# Patient Record
Sex: FEMALE | Race: BLACK OR AFRICAN AMERICAN | NOT HISPANIC OR LATINO | Employment: OTHER | URBAN - METROPOLITAN AREA
[De-identification: names, ages, dates, MRNs, and addresses within clinical notes are randomized per-mention and may not be internally consistent; named-entity substitution may affect disease eponyms.]

---

## 2018-04-08 PROBLEM — F03.918 DEMENTIA WITH BEHAVIORAL DISTURBANCE: Chronic | Status: RESOLVED | Noted: 2018-04-08 | Resolved: 2018-04-08

## 2018-04-08 PROBLEM — I10 ESSENTIAL HYPERTENSION: Chronic | Status: ACTIVE | Noted: 2018-04-08

## 2018-04-08 PROBLEM — E78.5 HYPERLIPIDEMIA: Chronic | Status: ACTIVE | Noted: 2018-04-08

## 2018-04-08 PROBLEM — K21.9 GERD (GASTROESOPHAGEAL REFLUX DISEASE): Chronic | Status: ACTIVE | Noted: 2018-04-08

## 2018-04-08 PROBLEM — R07.9 CHEST PAIN: Status: ACTIVE | Noted: 2018-04-08

## 2018-04-08 PROBLEM — F03.918 DEMENTIA WITH BEHAVIORAL DISTURBANCE: Chronic | Status: ACTIVE | Noted: 2018-04-08

## 2018-04-09 PROBLEM — R55 SYNCOPE AND COLLAPSE: Status: ACTIVE | Noted: 2018-04-09

## 2018-04-09 PROBLEM — B02.9 SHINGLES: Status: ACTIVE | Noted: 2018-04-09

## 2018-04-09 PROBLEM — Z86.73 HISTORY OF CVA (CEREBROVASCULAR ACCIDENT): Chronic | Status: ACTIVE | Noted: 2018-04-09

## 2018-04-10 ENCOUNTER — HOSPITAL ENCOUNTER (INPATIENT)
Facility: HOSPITAL | Age: 83
LOS: 2 days | Discharge: HOME HEALTH | DRG: 312 | End: 2018-04-13
Attending: EMERGENCY MEDICINE | Admitting: HOSPITALIST
Payer: MEDICARE

## 2018-04-10 DIAGNOSIS — R55 SYNCOPE AND COLLAPSE: ICD-10-CM

## 2018-04-10 DIAGNOSIS — R19.5 HEME POSITIVE STOOL: ICD-10-CM

## 2018-04-10 DIAGNOSIS — R55 SYNCOPE, UNSPECIFIED SYNCOPE TYPE: Primary | ICD-10-CM

## 2018-04-10 DIAGNOSIS — R19.7 DIARRHEA, UNSPECIFIED TYPE: ICD-10-CM

## 2018-04-10 DIAGNOSIS — R07.9 CHEST PAIN: ICD-10-CM

## 2018-04-10 DIAGNOSIS — R55 SYNCOPE: ICD-10-CM

## 2018-04-10 DIAGNOSIS — K92.2 GASTROINTESTINAL HEMORRHAGE, UNSPECIFIED GASTROINTESTINAL HEMORRHAGE TYPE: ICD-10-CM

## 2018-04-10 DIAGNOSIS — Z86.73 HISTORY OF CVA (CEREBROVASCULAR ACCIDENT): Chronic | ICD-10-CM

## 2018-04-10 DIAGNOSIS — I10 ESSENTIAL HYPERTENSION: Chronic | ICD-10-CM

## 2018-04-10 DIAGNOSIS — R53.83 FATIGUE: ICD-10-CM

## 2018-04-10 DIAGNOSIS — N17.9 ACUTE RENAL FAILURE, UNSPECIFIED ACUTE RENAL FAILURE TYPE: ICD-10-CM

## 2018-04-10 PROBLEM — E03.8 SUBCLINICAL HYPOTHYROIDISM: Status: ACTIVE | Noted: 2018-04-10

## 2018-04-10 LAB
ALBUMIN SERPL BCP-MCNC: 4 G/DL
ALP SERPL-CCNC: 131 U/L
ALT SERPL W/O P-5'-P-CCNC: 49 U/L
ANION GAP SERPL CALC-SCNC: 9 MMOL/L
AST SERPL-CCNC: 38 U/L
BASOPHILS # BLD AUTO: 0.01 K/UL
BASOPHILS NFR BLD: 0.1 %
BILIRUB SERPL-MCNC: 0.5 MG/DL
BNP SERPL-MCNC: 15 PG/ML
BUN SERPL-MCNC: 46 MG/DL
CALCIUM SERPL-MCNC: 9.6 MG/DL
CHLORIDE SERPL-SCNC: 105 MMOL/L
CO2 SERPL-SCNC: 26 MMOL/L
CREAT SERPL-MCNC: 2.1 MG/DL
DIFFERENTIAL METHOD: ABNORMAL
EOSINOPHIL # BLD AUTO: 0 K/UL
EOSINOPHIL NFR BLD: 0.4 %
ERYTHROCYTE [DISTWIDTH] IN BLOOD BY AUTOMATED COUNT: 15.6 %
EST. GFR  (AFRICAN AMERICAN): 24 ML/MIN/1.73 M^2
EST. GFR  (NON AFRICAN AMERICAN): 21 ML/MIN/1.73 M^2
GLUCOSE SERPL-MCNC: 159 MG/DL
HCT VFR BLD AUTO: 41.5 %
HGB BLD-MCNC: 14.1 G/DL
LACTATE SERPL-SCNC: 2.5 MMOL/L
LYMPHOCYTES # BLD AUTO: 1 K/UL
LYMPHOCYTES NFR BLD: 13.3 %
MCH RBC QN AUTO: 32 PG
MCHC RBC AUTO-ENTMCNC: 34 G/DL
MCV RBC AUTO: 94 FL
MONOCYTES # BLD AUTO: 0.6 K/UL
MONOCYTES NFR BLD: 7.4 %
NEUTROPHILS # BLD AUTO: 6 K/UL
NEUTROPHILS NFR BLD: 78.8 %
PLATELET # BLD AUTO: 149 K/UL
PMV BLD AUTO: 9 FL
POTASSIUM SERPL-SCNC: 5 MMOL/L
PROT SERPL-MCNC: 8 G/DL
RBC # BLD AUTO: 4.41 M/UL
SODIUM SERPL-SCNC: 140 MMOL/L
TROPONIN I SERPL DL<=0.01 NG/ML-MCNC: <0.006 NG/ML
TSH SERPL DL<=0.005 MIU/L-ACNC: 10.02 UIU/ML
WBC # BLD AUTO: 7.69 K/UL

## 2018-04-10 PROCEDURE — 87040 BLOOD CULTURE FOR BACTERIA: CPT | Mod: 59

## 2018-04-10 PROCEDURE — 83605 ASSAY OF LACTIC ACID: CPT

## 2018-04-10 PROCEDURE — 83880 ASSAY OF NATRIURETIC PEPTIDE: CPT

## 2018-04-10 PROCEDURE — 84443 ASSAY THYROID STIM HORMONE: CPT

## 2018-04-10 PROCEDURE — 84439 ASSAY OF FREE THYROXINE: CPT

## 2018-04-10 PROCEDURE — 93005 ELECTROCARDIOGRAM TRACING: CPT

## 2018-04-10 PROCEDURE — 80053 COMPREHEN METABOLIC PANEL: CPT

## 2018-04-10 PROCEDURE — 82550 ASSAY OF CK (CPK): CPT

## 2018-04-10 PROCEDURE — 84484 ASSAY OF TROPONIN QUANT: CPT

## 2018-04-10 PROCEDURE — 86901 BLOOD TYPING SEROLOGIC RH(D): CPT

## 2018-04-10 PROCEDURE — 93010 ELECTROCARDIOGRAM REPORT: CPT | Mod: ,,, | Performed by: INTERNAL MEDICINE

## 2018-04-10 PROCEDURE — 85025 COMPLETE CBC W/AUTO DIFF WBC: CPT

## 2018-04-11 PROBLEM — K92.2 GIB (GASTROINTESTINAL BLEEDING): Status: ACTIVE | Noted: 2018-04-11

## 2018-04-11 PROBLEM — R55 SYNCOPE: Status: ACTIVE | Noted: 2018-04-11

## 2018-04-11 PROBLEM — N17.9 ACUTE RENAL FAILURE: Status: ACTIVE | Noted: 2018-04-11

## 2018-04-11 LAB
ABO + RH BLD: NORMAL
ANION GAP SERPL CALC-SCNC: 8 MMOL/L
BASOPHILS # BLD AUTO: 0 K/UL
BASOPHILS NFR BLD: 0 %
BILIRUB UR QL STRIP: NEGATIVE
BLD GP AB SCN CELLS X3 SERPL QL: NORMAL
BUN SERPL-MCNC: 36 MG/DL
CALCIUM SERPL-MCNC: 8.7 MG/DL
CHLORIDE SERPL-SCNC: 111 MMOL/L
CK SERPL-CCNC: 303 U/L
CLARITY UR: CLEAR
CO2 SERPL-SCNC: 18 MMOL/L
COLOR UR: YELLOW
CREAT SERPL-MCNC: 1.4 MG/DL
DIFFERENTIAL METHOD: ABNORMAL
EOSINOPHIL # BLD AUTO: 0 K/UL
EOSINOPHIL NFR BLD: 0.5 %
ERYTHROCYTE [DISTWIDTH] IN BLOOD BY AUTOMATED COUNT: 15.4 %
EST. GFR  (AFRICAN AMERICAN): 39 ML/MIN/1.73 M^2
EST. GFR  (NON AFRICAN AMERICAN): 34 ML/MIN/1.73 M^2
ESTIMATED PA SYSTOLIC PRESSURE: 34.81
GLOBAL PERICARDIAL EFFUSION: NORMAL
GLUCOSE SERPL-MCNC: 109 MG/DL
GLUCOSE UR QL STRIP: NEGATIVE
HCT VFR BLD AUTO: 40.1 %
HGB BLD-MCNC: 13.2 G/DL
HGB UR QL STRIP: NEGATIVE
KETONES UR QL STRIP: NEGATIVE
LEUKOCYTE ESTERASE UR QL STRIP: NEGATIVE
LYMPHOCYTES # BLD AUTO: 1.3 K/UL
LYMPHOCYTES NFR BLD: 20.2 %
MCH RBC QN AUTO: 31.6 PG
MCHC RBC AUTO-ENTMCNC: 32.9 G/DL
MCV RBC AUTO: 96 FL
MITRAL VALVE REGURGITATION: NORMAL
MONOCYTES # BLD AUTO: 0.6 K/UL
MONOCYTES NFR BLD: 9 %
NEUTROPHILS # BLD AUTO: 4.6 K/UL
NEUTROPHILS NFR BLD: 70.3 %
NITRITE UR QL STRIP: NEGATIVE
PH UR STRIP: 5 [PH] (ref 5–8)
PLATELET # BLD AUTO: 114 K/UL
PMV BLD AUTO: 9.2 FL
POTASSIUM SERPL-SCNC: 5.8 MMOL/L
PROT UR QL STRIP: NEGATIVE
RBC # BLD AUTO: 4.18 M/UL
RETIRED EF AND QEF - SEE NOTES: 55 (ref 55–65)
SODIUM SERPL-SCNC: 137 MMOL/L
SP GR UR STRIP: 1.01 (ref 1–1.03)
T4 FREE SERPL-MCNC: 0.98 NG/DL
TRICUSPID VALVE REGURGITATION: NORMAL
TROPONIN I SERPL DL<=0.01 NG/ML-MCNC: <0.006 NG/ML
URN SPEC COLLECT METH UR: NORMAL
UROBILINOGEN UR STRIP-ACNC: NEGATIVE EU/DL
WBC # BLD AUTO: 6.53 K/UL

## 2018-04-11 PROCEDURE — 84484 ASSAY OF TROPONIN QUANT: CPT

## 2018-04-11 PROCEDURE — 99222 1ST HOSP IP/OBS MODERATE 55: CPT | Mod: ,,, | Performed by: PSYCHIATRY & NEUROLOGY

## 2018-04-11 PROCEDURE — 93306 TTE W/DOPPLER COMPLETE: CPT | Mod: 26,,, | Performed by: INTERNAL MEDICINE

## 2018-04-11 PROCEDURE — 36415 COLL VENOUS BLD VENIPUNCTURE: CPT

## 2018-04-11 PROCEDURE — 80048 BASIC METABOLIC PNL TOTAL CA: CPT

## 2018-04-11 PROCEDURE — 93306 TTE W/DOPPLER COMPLETE: CPT

## 2018-04-11 PROCEDURE — 11000001 HC ACUTE MED/SURG PRIVATE ROOM

## 2018-04-11 PROCEDURE — 25000003 PHARM REV CODE 250: Performed by: HOSPITALIST

## 2018-04-11 PROCEDURE — 87086 URINE CULTURE/COLONY COUNT: CPT

## 2018-04-11 PROCEDURE — 25000003 PHARM REV CODE 250: Performed by: EMERGENCY MEDICINE

## 2018-04-11 PROCEDURE — C9113 INJ PANTOPRAZOLE SODIUM, VIA: HCPCS | Performed by: HOSPITALIST

## 2018-04-11 PROCEDURE — 94761 N-INVAS EAR/PLS OXIMETRY MLT: CPT

## 2018-04-11 PROCEDURE — 85025 COMPLETE CBC W/AUTO DIFF WBC: CPT

## 2018-04-11 PROCEDURE — 81003 URINALYSIS AUTO W/O SCOPE: CPT

## 2018-04-11 PROCEDURE — 97162 PT EVAL MOD COMPLEX 30 MIN: CPT

## 2018-04-11 PROCEDURE — 97530 THERAPEUTIC ACTIVITIES: CPT

## 2018-04-11 PROCEDURE — G8978 MOBILITY CURRENT STATUS: HCPCS | Mod: CJ

## 2018-04-11 PROCEDURE — 63600175 PHARM REV CODE 636 W HCPCS: Performed by: HOSPITALIST

## 2018-04-11 PROCEDURE — G8979 MOBILITY GOAL STATUS: HCPCS | Mod: CH

## 2018-04-11 RX ORDER — PANTOPRAZOLE SODIUM 40 MG/1
40 TABLET, DELAYED RELEASE ORAL DAILY
Status: DISCONTINUED | OUTPATIENT
Start: 2018-04-11 | End: 2018-04-11

## 2018-04-11 RX ORDER — PROCHLORPERAZINE EDISYLATE 5 MG/ML
5 INJECTION INTRAMUSCULAR; INTRAVENOUS EVERY 6 HOURS PRN
Status: DISCONTINUED | OUTPATIENT
Start: 2018-04-11 | End: 2018-04-13 | Stop reason: HOSPADM

## 2018-04-11 RX ORDER — MEMANTINE HYDROCHLORIDE 10 MG/1
10 TABLET ORAL 2 TIMES DAILY
Status: DISCONTINUED | OUTPATIENT
Start: 2018-04-11 | End: 2018-04-13 | Stop reason: HOSPADM

## 2018-04-11 RX ORDER — POTASSIUM CHLORIDE 20 MEQ/1
20 TABLET, EXTENDED RELEASE ORAL DAILY
Status: DISCONTINUED | OUTPATIENT
Start: 2018-04-11 | End: 2018-04-11

## 2018-04-11 RX ORDER — ACETAMINOPHEN 325 MG/1
650 TABLET ORAL EVERY 8 HOURS PRN
Status: DISCONTINUED | OUTPATIENT
Start: 2018-04-11 | End: 2018-04-13 | Stop reason: HOSPADM

## 2018-04-11 RX ORDER — VALACYCLOVIR HYDROCHLORIDE 500 MG/1
1000 TABLET, FILM COATED ORAL 3 TIMES DAILY
Status: DISCONTINUED | OUTPATIENT
Start: 2018-04-11 | End: 2018-04-13 | Stop reason: HOSPADM

## 2018-04-11 RX ORDER — ATORVASTATIN CALCIUM 10 MG/1
10 TABLET, FILM COATED ORAL DAILY
Status: DISCONTINUED | OUTPATIENT
Start: 2018-04-11 | End: 2018-04-13 | Stop reason: HOSPADM

## 2018-04-11 RX ORDER — ONDANSETRON 2 MG/ML
4 INJECTION INTRAMUSCULAR; INTRAVENOUS EVERY 4 HOURS PRN
Status: DISCONTINUED | OUTPATIENT
Start: 2018-04-11 | End: 2018-04-13 | Stop reason: HOSPADM

## 2018-04-11 RX ORDER — MORPHINE SULFATE 4 MG/ML
2 INJECTION, SOLUTION INTRAMUSCULAR; INTRAVENOUS EVERY 4 HOURS PRN
Status: DISCONTINUED | OUTPATIENT
Start: 2018-04-11 | End: 2018-04-13 | Stop reason: HOSPADM

## 2018-04-11 RX ORDER — GABAPENTIN 100 MG/1
100 CAPSULE ORAL 3 TIMES DAILY
Status: DISCONTINUED | OUTPATIENT
Start: 2018-04-11 | End: 2018-04-13 | Stop reason: HOSPADM

## 2018-04-11 RX ORDER — SODIUM CHLORIDE 9 MG/ML
INJECTION, SOLUTION INTRAVENOUS CONTINUOUS
Status: DISCONTINUED | OUTPATIENT
Start: 2018-04-11 | End: 2018-04-13 | Stop reason: HOSPADM

## 2018-04-11 RX ORDER — RANOLAZINE 500 MG/1
1000 TABLET, EXTENDED RELEASE ORAL 2 TIMES DAILY
Status: DISCONTINUED | OUTPATIENT
Start: 2018-04-11 | End: 2018-04-13 | Stop reason: HOSPADM

## 2018-04-11 RX ORDER — BENAZEPRIL HYDROCHLORIDE 10 MG/1
10 TABLET ORAL DAILY
Status: DISCONTINUED | OUTPATIENT
Start: 2018-04-11 | End: 2018-04-11

## 2018-04-11 RX ORDER — ISOSORBIDE MONONITRATE 30 MG/1
30 TABLET, EXTENDED RELEASE ORAL DAILY
Status: DISCONTINUED | OUTPATIENT
Start: 2018-04-11 | End: 2018-04-11

## 2018-04-11 RX ORDER — PANTOPRAZOLE SODIUM 40 MG/10ML
40 INJECTION, POWDER, LYOPHILIZED, FOR SOLUTION INTRAVENOUS 2 TIMES DAILY
Status: DISCONTINUED | OUTPATIENT
Start: 2018-04-11 | End: 2018-04-11 | Stop reason: CLARIF

## 2018-04-11 RX ADMIN — MEMANTINE 10 MG: 10 TABLET ORAL at 08:04

## 2018-04-11 RX ADMIN — GABAPENTIN 100 MG: 100 CAPSULE ORAL at 08:04

## 2018-04-11 RX ADMIN — GABAPENTIN 100 MG: 100 CAPSULE ORAL at 09:04

## 2018-04-11 RX ADMIN — ATORVASTATIN CALCIUM 10 MG: 10 TABLET, FILM COATED ORAL at 08:04

## 2018-04-11 RX ADMIN — VALACYCLOVIR HYDROCHLORIDE 1000 MG: 500 TABLET, FILM COATED ORAL at 02:04

## 2018-04-11 RX ADMIN — DEXTROSE 40 MG: 50 INJECTION, SOLUTION INTRAVENOUS at 08:04

## 2018-04-11 RX ADMIN — RANOLAZINE 1000 MG: 500 TABLET, FILM COATED, EXTENDED RELEASE ORAL at 08:04

## 2018-04-11 RX ADMIN — VALACYCLOVIR HYDROCHLORIDE 1000 MG: 500 TABLET, FILM COATED ORAL at 09:04

## 2018-04-11 RX ADMIN — VALACYCLOVIR HYDROCHLORIDE 1000 MG: 500 TABLET, FILM COATED ORAL at 08:04

## 2018-04-11 RX ADMIN — DEXTROSE 40 MG: 50 INJECTION, SOLUTION INTRAVENOUS at 09:04

## 2018-04-11 RX ADMIN — GABAPENTIN 100 MG: 100 CAPSULE ORAL at 02:04

## 2018-04-11 RX ADMIN — RANOLAZINE 1000 MG: 500 TABLET, FILM COATED, EXTENDED RELEASE ORAL at 09:04

## 2018-04-11 RX ADMIN — MEMANTINE 10 MG: 10 TABLET ORAL at 09:04

## 2018-04-11 RX ADMIN — SODIUM CHLORIDE: 0.9 INJECTION, SOLUTION INTRAVENOUS at 04:04

## 2018-04-11 NOTE — PROGRESS NOTES
charge Nurse Jackie' ambulated pt to bathroom, yellow socks in use. linens were on floor prior to fall but were picked up prior to walking to bathroom. pt assisted to toilet. informed pt not to get up. door closed for privacy. Nurse remained in room. Nurse Leif called from desk to inform me that pts tele lead was off. opened bathroom door and pt was on knees. pt states she did not hit head, states she hit right ear-no redness/swelling noted.Pt assisted back to bed. Dr Jurado informed. VSS. Pt states she stood up to wipe herself and got weak.  Informed Dr Jurado that it appeared that some blood was in pt stool. House Horacio Dozier notified. bed alarm in use.

## 2018-04-11 NOTE — PLAN OF CARE
04/11/18 1040   Discharge Assessment   Assessment Type Discharge Planning Assessment   Confirmed/corrected address and phone number on facesheet? Yes   Assessment information obtained from? Other   Prior to hospitilization cognitive status: Alert/Oriented   Prior to hospitalization functional status: Independent   Current cognitive status: Alert/Oriented   Current Functional Status: Independent   Lives With alone   Able to Return to Prior Arrangements yes   Is patient able to care for self after discharge? Yes   Who are your caregiver(s) and their phone number(s)? Ling Hayes @ [phone]   Patient's perception of discharge disposition home health   Readmission Within The Last 30 Days previous discharge plan unsuccessful   Patient currently being followed by outpatient case management? No   Patient currently receives any other outside agency services? No   Equipment Currently Used at Home cane, straight;walker, rolling   Do you have any problems affording any of your prescribed medications? No   Is the patient taking medications as prescribed? yes   Does the patient have transportation home? Yes   Transportation Available car;family or friend will provide   Does the patient receive services at the [facility]? No   Discharge Plan A Home;Home Health   Discharge Plan B Home;Home Health   Patient/Family In Agreement With Plan yes   Readmission Questionnaire   At the time of your discharge, did someone talk to you about what your health problems were? Yes   At the time of discharge, did someone talk to you about what to watch out for regarding worsening of your health problem? Yes   At the time of discharge, did someone talk to you about what to do if you experienced worsening of your health problem? Yes   At the time of discharge, did someone talk to you about which medication to take when you left the hospital and which ones to stop taking? Yes   At the time of discharge, did someone talk to you about when and  where to follow up with a doctor after you left the hospital? Yes   How often do you need to have someone help you when you read instructions, pamphlets, or other written material from your doctor or pharmacy? Sometimes   Do you have problems taking your medications as prescribed? No   Do you have any problems affording any of  your prescribed medications? No   Do you have problems obtaining/receiving your medications? No   Does the patient have transportation to healthcare appointments? Yes   Does the patient have family/friends to help with healtcare needs after discharge? yes   Does your caregiver provide all the help you need? Yes   Are you currently feeling confused? No   Are you currently having problems thinking? No   Are you currently having memory problems? No   Have you felt down, depressed, or hopeless? 1   Have you felt little interest or pleasure in doing things? 1   In the last 7 days, my sleep quality was: fair         [id number] [place] [facility] [address] [facility]  [address] [address]  Phone: [phone] Fax: [phone]

## 2018-04-11 NOTE — ED NOTES
Pt kept insisting that she wanted to get up and go to the bathroom; pt rolled to restroom on EMS stretcher; upon sitting pt up on EMS stretcher, pt had near syncopal episode; pt safely assisted back onto EMS stretcher; will room pt in ED soon;

## 2018-04-11 NOTE — ED NOTES
Lab arrived at bedside for blood draw but pt currently on bedpan; lab will return when pt is finished

## 2018-04-11 NOTE — PLAN OF CARE
Problem: Patient Care Overview  Goal: Plan of Care Review  Patient with no concerns voiced, able to make her needs known, IV fluids infusing as ordered, tolerating clear liquid diet w/o difficulties, ambulated to bathroom w/assist, safety maintained.

## 2018-04-11 NOTE — ED PROVIDER NOTES
Encounter Date: 4/10/2018       History     Chief Complaint   Patient presents with    Generalized Weakness     pt c/o generalized weakness; pt hypotensive 70/42 on scene with EMS; pt recieved 500ml NS bolus and now 110/50; pt was just discharged from here a few hours prior with shingles     This is an emergent evaluation of an 86-year-old female who presents status post syncopal episode.  Patient was OBS'ed here Sunday 4/8/18 - today Tuesday 4/10/18 for chest pain; during this visit, she was also diagnosed with shingles (L chest wall).  She was discharged home today with her niece.  Patient was in her niece's vehicle when she blacked out; niece activated EMS.  Patient passed out again here in the ED, after she insisted on using our [place] restroom.  She denies chest pain or shortness of breath.  She denies mechanical falls.      Lives alone in second-floor apartment.    Niece: Brenda Keen, [phone] (mobile) / [phone] (house).    PMH: HTN, DLD  PSH: hysterectomy, back surgeyr  Social: nonsmoker          Review of patient's allergies indicates:   Allergen Reactions    Codeine     Darvon [propoxyphene]     Excedrin migraine [aspirin-acetaminophen-caffeine]     Zoloft [sertraline]      Past Medical History:   Diagnosis Date    Allergic rhinitis, cause unspecified     Essential hypertension, benign     Hyperlipidemia     Hypertension     Unspecified vitamin D deficiency      Past Surgical History:   Procedure Laterality Date    BACK SURGERY      HYSTERECTOMY       No family history on file.  Social History   Substance Use Topics    Smoking status: Never Smoker    Smokeless tobacco: Not on file    Alcohol use No     Review of Systems   Constitutional: Negative for appetite change, diaphoresis and fever.   HENT: Negative for rhinorrhea.    Eyes: Negative for visual disturbance.   Respiratory: Negative for shortness of breath.    Cardiovascular: Negative for chest pain.   Gastrointestinal: Negative  for abdominal pain and vomiting.   Genitourinary: Negative for flank pain.   Musculoskeletal: Negative for back pain and neck stiffness.   Skin: Negative for pallor.   Neurological: Positive for syncope.       Physical Exam     Initial Vitals [04/10/18 2002]   BP Pulse Resp Temp SpO2   (!) 110/50 67 16 97.4 °F (36.3 °C) 97 %      MAP       70         Physical Exam    Nursing note and vitals reviewed.  Constitutional: She appears well-developed and well-nourished. She is not diaphoretic. No distress.   Awake and alert, elderly woman. Nontoxic. Fluent speech. NAD.   HENT:   Head: Normocephalic and atraumatic.   Mouth/Throat: Oropharynx is clear and moist.   Eyes: Conjunctivae and EOM are normal. Pupils are equal, round, and reactive to light.   Neck: Normal range of motion. Neck supple.   Cardiovascular: Normal rate, regular rhythm and intact distal pulses.   No murmur heard.  Pulmonary/Chest: Breath sounds normal. No respiratory distress. She has no wheezes. She has no rhonchi. She has no rales.   L chest wall with vesicular dermatomal rash c/w shingles. No significant discharge. Minimal erythema.   Abdominal: Soft. Bowel sounds are normal. She exhibits no distension. There is no tenderness. There is no rebound and no guarding.   Genitourinary: Rectal exam shows guaiac positive stool. Guaiac positive stool. : Acceptable.  Musculoskeletal: Normal range of motion. She exhibits no edema or tenderness.   Neurological: She is alert and oriented to person, place, and time. She has normal strength. No cranial nerve deficit.   Skin: Skin is warm and dry.   Psychiatric: She has a normal mood and affect.         ED Course   Procedures  Labs Reviewed   CULTURE, BLOOD   CULTURE, BLOOD   CULTURE, URINE   CBC W/ AUTO DIFFERENTIAL   COMPREHENSIVE METABOLIC PANEL   LACTIC ACID, PLASMA   B-TYPE NATRIURETIC PEPTIDE   URINALYSIS   TROPONIN I   TSH     EKG Readings: (Independently Interpreted)   EMS 19:24: NSR, HR 65. L  axis. Normal intervals. No STEMI.   ED 20:45: NSR, HR 61. L axis. Normal intervals. No STEMI. C/w 4/9/18.         X-Rays:   Independently Interpreted Readings:   Other Readings:  CXR NAD.     Medical Decision Making:   History:   Old Medical Records: I decided to obtain old medical records.  Old Records Summarized: records from previous admission(s).  Initial Assessment:   86-year-old female status post multiple syncopal episodes today.  Patient was just discharged earlier this afternoon after a chest pain OBS stay.  Differential Diagnosis:   Ddx includes ACS, CHF, PE, orthostatic syncope, dehydration, KEN, hypoglycemia, occult infection, other.  Independently Interpreted Test(s):   I have ordered and independently interpreted X-rays - see prior notes.  I have ordered and independently interpreted EKG Reading(s) - see prior notes  Clinical Tests:   Lab Tests: Ordered and Reviewed  Radiological Study: Ordered and Reviewed  Medical Tests: Ordered and Reviewed  ED Management:  EKG and CXR c/w prior.    Patient's BPs low in ED despite NS bolus by EMS (99/58) but then spontaneously improved. Otherwise reassuring vitals and exam. Has L chest wall shingles but no evidence of superinfection.    While in ED, patient had several BMs, initially dark, then more red in color. Strongly heme+.     Labs: Hgb stable. BUN 46, creatinine 2.1; BUN 21, creatinine 0.9 on 4/8/18. Lactate 2.5.     Given acute renal insufficiency and GI bleed with multiple episodes of syncope today, patient requires re-admission for monitoring and care. I have discussed her case with nocturnist Dr. Gaxiola. I have written courtesy orders including echocardiogram (patient has never had in our system), telemetry monitoring (for syncope), and repeat CBC, troponin, BMP.    Patient is aware of plan.  Other:   I have discussed this case with another health care provider.                      Clinical Impression:   The primary encounter diagnosis was Syncope,  unspecified syncope type. Diagnoses of Fatigue, Chest pain, Heme positive stool, Gastrointestinal hemorrhage, unspecified gastrointestinal hemorrhage type, Diarrhea, unspecified type, Acute renal failure, unspecified acute renal failure type, and Syncope were also pertinent to this visit.                           Jessica Lane MD  04/11/18 06

## 2018-04-11 NOTE — CONSULTS
[facility]  Neurology  Consult Note    Patient Name: Christina Curtis  MRN: [id number]  Admission Date: 4/10/2018  Hospital Length of Stay: 0 days  Code Status: Full Code   Attending Provider: Juarez Victoria MD   Consulting Provider: Edwin Harris MD  Primary Care Physician: Neetu Mcelroy MD  Principal Problem:<principal problem not specified>    Inpatient consult to Neurology  Consult performed by: EDWIN HARRIS  Consult ordered by: JUAREZ VICTORIA        Subjective:     Chief Complaint: Syncope    HPI: 85 y/o female with medical Hx as listed below comes to ED for syncope. Pt recently admitted for chest pain found to have active   herpes-zoster in left trunk area. Today pt had a syncope while being the passenger in a vehicle. Regained consciousness with no resulting motor or sensory deficits.    She also had a syncope while in hospital upon sitting up with reported convulsions during her last admission.     Past Medical History:   Diagnosis Date    Allergic rhinitis, cause unspecified     Essential hypertension, benign     Hyperlipidemia     Hypertension     Shingles     Unspecified vitamin D deficiency        Past Surgical History:   Procedure Laterality Date    BACK SURGERY      HYSTERECTOMY         Review of patient's allergies indicates:   Allergen Reactions    Codeine     Darvon [propoxyphene]     Excedrin migraine [aspirin-acetaminophen-caffeine]     Zoloft [sertraline]        Current Neurological Medications:     Current Facility-Administered Medications on File Prior to Encounter   Medication    [DISCONTINUED] acetaminophen tablet 500 mg    [DISCONTINUED] amLODIPine tablet 10 mg    [DISCONTINUED] aspirin tablet 325 mg    [DISCONTINUED] atorvastatin tablet 10 mg    [DISCONTINUED] benazepril tablet 10 mg    [DISCONTINUED] benazepril tablet 20 mg    [DISCONTINUED] cloNIDine tablet 0.1 mg    [DISCONTINUED] enoxaparin injection 40 mg    [DISCONTINUED] gabapentin capsule  100 mg    [DISCONTINUED] isosorbide mononitrate 24 hr tablet 30 mg    [DISCONTINUED] memantine tablet 10 mg    [DISCONTINUED] ondansetron disintegrating tablet 8 mg    [DISCONTINUED] oxyCODONE-acetaminophen 5-325 mg per tablet 1 tablet    [DISCONTINUED] pantoprazole EC tablet 40 mg    [DISCONTINUED] promethazine (PHENERGAN) 12.5 mg in dextrose 5 % 50 mL IVPB    [DISCONTINUED] ramelteon tablet 8 mg    [DISCONTINUED] ranolazine 12 hr tablet 1,000 mg    [DISCONTINUED] senna-docusate 8.6-50 mg per tablet 1 tablet    [DISCONTINUED] valACYclovir tablet 1,000 mg     Current Outpatient Prescriptions on File Prior to Encounter   Medication Sig    benzonatate (TESSALON) 100 MG capsule     NAMENDA 10 mg Tab Take 10 mg by mouth 2 (two) times daily.    RANEXA 1,000 mg Tb12     acetaminophen (TYLENOL) 500 MG tablet Take 500 mg by mouth every 6 (six) hours as needed for Pain.    atorvastatin (LIPITOR) 10 MG tablet Take 10 mg by mouth once daily.    benazepril (LOTENSIN) 10 MG tablet Take 1 tablet (10 mg total) by mouth once daily.    cefUROXime (CEFTIN) 500 MG tablet Take 500 mg by mouth 2 (two) times daily.    ergocalciferol (ERGOCALCIFEROL) 50,000 unit Cap Take 50,000 Units by mouth every 7 days.    gabapentin (NEURONTIN) 100 MG capsule Take 100 mg by mouth 3 (three) times daily.    guaifenesin (MUCINEX) 600 mg 12 hr tablet Take 1,200 mg by mouth 2 (two) times daily.    guaifenesin 100 mg/5 ml (ROBITUSSIN) 100 mg/5 mL syrup Take 200 mg by mouth 3 (three) times daily as needed for Cough.    isosorbide mononitrate (IMDUR) 30 MG 24 hr tablet Take 30 mg by mouth once daily.    pantoprazole (PROTONIX) 40 MG tablet Take 40 mg by mouth once daily.    potassium chloride (MICRO-K) 10 MEQ CpSR Take 10 mEq by mouth once daily.    traMADol (ULTRAM) 50 mg tablet Take 1 tablet (50 mg total) by mouth every 6 (six) hours as needed for Pain (9-10/10 pain).    valACYclovir (VALTREX) 1000 MG tablet Take 1 tablet (1,000 mg  total) by mouth 3 (three) times daily.      Family History     None        Social History Main Topics    Smoking status: Never Smoker    Smokeless tobacco: Never Used    Alcohol use No    Drug use: No    Sexual activity: No     Review of Systems   Constitutional: Negative for fever.   HENT: Negative for trouble swallowing.    Eyes: Negative for photophobia.   Respiratory: Negative for shortness of breath.    Cardiovascular: Negative for palpitations.   Gastrointestinal: Negative for abdominal pain.   Genitourinary: Negative for dysuria.   Musculoskeletal: Negative for myalgias.   Neurological: Negative for headaches.   Psychiatric/Behavioral: Negative for hallucinations.     Objective:     Vital Signs (Most Recent):  Temp: 98.5 °F (36.9 °C) (04/11/18 1326)  Pulse: 72 (04/11/18 1326)  Resp: 18 (04/11/18 1326)  BP: 139/64 (04/11/18 1326)  SpO2: 96 % (04/11/18 1326) Vital Signs (24h Range):  Temp:  [97 °F (36.1 °C)-98.6 °F (37 °C)] 98.5 °F (36.9 °C)  Pulse:  [60-76] 72  Resp:  [16-20] 18  SpO2:  [96 %-100 %] 96 %  BP: ()/(47-66) 139/64     Weight: 55.8 kg (123 lb)  Body mass index is 22.5 kg/m².    Physical Exam  Constitutional: She is oriented to person, place, and time. No distress.   HENT:   Head: Normocephalic.   Eyes: Right eye exhibits no discharge. Left eye exhibits no discharge.   Neck: Neck supple.   Cardiovascular: Normal rate.    Pulmonary/Chest: Breath sounds normal.   Abdominal: Bowel sounds are normal.   Musculoskeletal: She exhibits no edema.   Neurological: She is oriented to person, place, and time. She has normal strength. She has a normal Finger-Nose-Finger Test.   Skin: She is not diaphoretic.   Psychiatric: Her speech is normal.         NEUROLOGICAL EXAMINATION:      MENTAL STATUS   Oriented to person, place, and time.   Speech: speech is normal   Level of consciousness: alert     CRANIAL NERVES      CN III, IV, VI   Right pupil: Size: 2 mm. Shape: regular. Reactivity: brisk.   Left  pupil: Size: 2 mm. Shape: regular. Reactivity: brisk.   Nystagmus: none   Ophthalmoparesis: none     CN VII   Right facial weakness: none  Left facial weakness: none     CN IX, X   Palate: symmetric     CN XII   Tongue deviation: none     MOTOR EXAM      Strength   Strength 5/5 throughout.      SENSORY EXAM   Right arm light touch: normal  Left arm light touch: normal  Right leg light touch: normal  Left leg light touch: normal     GAIT AND COORDINATION      Coordination   Finger to nose coordination: normal     Tremor   Resting tremor: absent     MMSE: 25/30           Significant Labs:   CBC:   Recent Labs  Lab 04/10/18  2217 04/11/18  0658   WBC 7.69 6.53   HGB 14.1 13.2   HCT 41.5 40.1   * 114*     CMP:   Recent Labs  Lab 04/10/18  2217 04/11/18  0658   * 109    137   K 5.0 5.8*    111*   CO2 26 18*   BUN 46* 36*   CREATININE 2.1* 1.4   CALCIUM 9.6 8.7   PROT 8.0  --    ALBUMIN 4.0  --    BILITOT 0.5  --    ALKPHOS 131  --    AST 38  --    ALT 49*  --    ANIONGAP 9 8   EGFRNONAA 21* 34*           Assessment and Plan:     85 y/o female consulted for syncope    1. Syncope: pt has been hypotensive. GI consulted due to blood in stool.    H/H stable.    No focal findings on exam.   -Will monitor for now.   -No driving.   -Perhaps is safe for pt to be in an assisted-living facility vs moving in with family?    Active Diagnoses:    Diagnosis Date Noted POA    Syncope [R55] 04/11/2018 Yes      Problems Resolved During this Admission:    Diagnosis Date Noted Date Resolved POA       VTE Risk Mitigation         Ordered     IP VTE HIGH RISK PATIENT  Once      04/11/18 0318     Reason for No Pharmacological VTE Prophylaxis  Once      04/11/18 0318     Place KATALINA hose  Until discontinued      04/11/18 0318     Place sequential compression device  Until discontinued      04/11/18 0318          Thank you for your consult. I will follow-up with patient. Please contact us if you have any additional  questions.    Edwin Michelle MD  Neurology  [facility]

## 2018-04-11 NOTE — ED NOTES
Lab returned to bedside but pt is being cleaned from having a 3rd BM; lab will return when pt is ready

## 2018-04-11 NOTE — ED NOTES
Pt had very large loose BM on bedpan; pt cleaned; pt tolerated well; pt states that she feels much better now since having the BM; pt reports that she drank prune juice today to have the BM

## 2018-04-11 NOTE — ED NOTES
Pt had another liquid BM and urinated in bedpan; pt cleaned; pt informed that we need a urine sample; pt reports that she will try to give just a urine sample; pt placed back on bedpan;

## 2018-04-11 NOTE — ED TRIAGE NOTES
Patient brought in via EMS c/o general weakness.she was previously discharged from the facility.Was hypotensive on arrival with EMS received fluids and was brought over. Patient mentions she was previously diagnosed with shingles on left lateral back. Denies SOB,vomiting, nausea and fever.

## 2018-04-11 NOTE — ED NOTES
Pt had 4th BM; stool is now reddish brown liquid; MD notified and at bedside to do check stool for blood; guiac positive

## 2018-04-12 LAB
ALBUMIN SERPL BCP-MCNC: 3.1 G/DL
ALP SERPL-CCNC: 88 U/L
ALT SERPL W/O P-5'-P-CCNC: 31 U/L
ANION GAP SERPL CALC-SCNC: 5 MMOL/L
AST SERPL-CCNC: 21 U/L
BASOPHILS # BLD AUTO: 0.01 K/UL
BASOPHILS NFR BLD: 0.2 %
BILIRUB SERPL-MCNC: 0.7 MG/DL
BUN SERPL-MCNC: 13 MG/DL
CALCIUM SERPL-MCNC: 8.9 MG/DL
CHLORIDE SERPL-SCNC: 110 MMOL/L
CO2 SERPL-SCNC: 24 MMOL/L
CREAT SERPL-MCNC: 0.9 MG/DL
DIFFERENTIAL METHOD: ABNORMAL
EOSINOPHIL # BLD AUTO: 0 K/UL
EOSINOPHIL NFR BLD: 0.5 %
ERYTHROCYTE [DISTWIDTH] IN BLOOD BY AUTOMATED COUNT: 15.6 %
EST. GFR  (AFRICAN AMERICAN): >60 ML/MIN/1.73 M^2
EST. GFR  (NON AFRICAN AMERICAN): 58 ML/MIN/1.73 M^2
GLUCOSE SERPL-MCNC: 111 MG/DL
HCT VFR BLD AUTO: 38.3 %
HGB BLD-MCNC: 12.5 G/DL
LYMPHOCYTES # BLD AUTO: 1.5 K/UL
LYMPHOCYTES NFR BLD: 27.3 %
MAGNESIUM SERPL-MCNC: 1.8 MG/DL
MCH RBC QN AUTO: 31.6 PG
MCHC RBC AUTO-ENTMCNC: 32.6 G/DL
MCV RBC AUTO: 97 FL
MONOCYTES # BLD AUTO: 0.3 K/UL
MONOCYTES NFR BLD: 5.1 %
NEUTROPHILS # BLD AUTO: 3.8 K/UL
NEUTROPHILS NFR BLD: 66.9 %
PHOSPHATE SERPL-MCNC: 2.5 MG/DL
PLATELET # BLD AUTO: 104 K/UL
PMV BLD AUTO: 9 FL
POTASSIUM SERPL-SCNC: 4.7 MMOL/L
PROT SERPL-MCNC: 6.5 G/DL
RBC # BLD AUTO: 3.96 M/UL
SODIUM SERPL-SCNC: 139 MMOL/L
WBC # BLD AUTO: 5.65 K/UL

## 2018-04-12 PROCEDURE — 11000001 HC ACUTE MED/SURG PRIVATE ROOM

## 2018-04-12 PROCEDURE — 85025 COMPLETE CBC W/AUTO DIFF WBC: CPT

## 2018-04-12 PROCEDURE — 25000003 PHARM REV CODE 250: Performed by: EMERGENCY MEDICINE

## 2018-04-12 PROCEDURE — 97165 OT EVAL LOW COMPLEX 30 MIN: CPT

## 2018-04-12 PROCEDURE — G8988 SELF CARE GOAL STATUS: HCPCS | Mod: CI

## 2018-04-12 PROCEDURE — G8987 SELF CARE CURRENT STATUS: HCPCS | Mod: CJ

## 2018-04-12 PROCEDURE — 83735 ASSAY OF MAGNESIUM: CPT

## 2018-04-12 PROCEDURE — 97116 GAIT TRAINING THERAPY: CPT

## 2018-04-12 PROCEDURE — 80053 COMPREHEN METABOLIC PANEL: CPT

## 2018-04-12 PROCEDURE — G8989 SELF CARE D/C STATUS: HCPCS | Mod: CJ

## 2018-04-12 PROCEDURE — 25000003 PHARM REV CODE 250: Performed by: HOSPITALIST

## 2018-04-12 PROCEDURE — 93010 ELECTROCARDIOGRAM REPORT: CPT | Mod: ,,, | Performed by: INTERNAL MEDICINE

## 2018-04-12 PROCEDURE — 63600175 PHARM REV CODE 636 W HCPCS: Performed by: HOSPITALIST

## 2018-04-12 PROCEDURE — 84100 ASSAY OF PHOSPHORUS: CPT

## 2018-04-12 PROCEDURE — C9113 INJ PANTOPRAZOLE SODIUM, VIA: HCPCS | Performed by: HOSPITALIST

## 2018-04-12 PROCEDURE — 97110 THERAPEUTIC EXERCISES: CPT

## 2018-04-12 PROCEDURE — 36415 COLL VENOUS BLD VENIPUNCTURE: CPT

## 2018-04-12 PROCEDURE — 93005 ELECTROCARDIOGRAM TRACING: CPT

## 2018-04-12 PROCEDURE — 99232 SBSQ HOSP IP/OBS MODERATE 35: CPT | Mod: ,,, | Performed by: PSYCHIATRY & NEUROLOGY

## 2018-04-12 RX ADMIN — MEMANTINE 10 MG: 10 TABLET ORAL at 09:04

## 2018-04-12 RX ADMIN — VALACYCLOVIR HYDROCHLORIDE 1000 MG: 500 TABLET, FILM COATED ORAL at 09:04

## 2018-04-12 RX ADMIN — RANOLAZINE 1000 MG: 500 TABLET, FILM COATED, EXTENDED RELEASE ORAL at 09:04

## 2018-04-12 RX ADMIN — DEXTROSE 40 MG: 50 INJECTION, SOLUTION INTRAVENOUS at 09:04

## 2018-04-12 RX ADMIN — GABAPENTIN 100 MG: 100 CAPSULE ORAL at 09:04

## 2018-04-12 RX ADMIN — GABAPENTIN 100 MG: 100 CAPSULE ORAL at 03:04

## 2018-04-12 RX ADMIN — ATORVASTATIN CALCIUM 10 MG: 10 TABLET, FILM COATED ORAL at 09:04

## 2018-04-12 RX ADMIN — SODIUM CHLORIDE: 0.9 INJECTION, SOLUTION INTRAVENOUS at 03:04

## 2018-04-12 RX ADMIN — VALACYCLOVIR HYDROCHLORIDE 1000 MG: 500 TABLET, FILM COATED ORAL at 03:04

## 2018-04-12 NOTE — PROGRESS NOTES
Chief Complaint / Reason for Consult: Hematochezia    No further sx's of overt GI bleeding    ROS: No CP, SOB, F/C    Patient Vitals for the past 24 hrs:   BP Temp Temp src Pulse Resp SpO2   04/12/18 1200 (!) 143/64 97.2 °F (36.2 °C) Oral 65 18 100 %   04/12/18 0734 131/60 97.9 °F (36.6 °C) Oral 60 18 100 %   04/12/18 0545 (!) 146/66 98.6 °F (37 °C) Oral 60 18 97 %   04/12/18 0004 133/63 98.9 °F (37.2 °C) Oral 63 18 98 %   04/11/18 2044 (!) 124/58 98.5 °F (36.9 °C) Oral 69 18 97 %   04/11/18 1626 (!) 138/56 97.9 °F (36.6 °C) Oral 71 18 95 %       Physical Exam:  Gen - Well developed, elderly, no apparent distress  HEENT - Anicteric  CV - S1, S2, no murmurs/rubs  Lungs - CTA-B, normal excursion  Abd - Soft, NT, ND, normal BS's, no HSM.  Ext - No c/c/e  Neuro - No asterixis    Labs:    Recent Labs  Lab 04/12/18  0843   WBC 5.65   RBC 3.96*   HGB 12.5   HCT 38.3   *   MCV 97   MCH 31.6*   MCHC 32.6       Recent Labs  Lab 04/12/18  0843   CALCIUM 8.9   PROT 6.5      K 4.7   CO2 24      BUN 13   CREATININE 0.9   ALKPHOS 88   ALT 31   AST 21   BILITOT 0.7       Imaging:  Reviewed CT Head, no acute findings    Assessment:  This patient is a 86 y.o. female with:   1. Hematochezia - Most consistent with LGI (mild diverticular or hemorrhoidal).  Appears to have ceased.  No anemia  2. Syncope  3. HTN, Hyperlipidemia.....    Recommendations:  1. Monitor for sx's of bleeding.  2. OK for diet, as tolerated.  3. Outpatient evaluation for C-scope  4. Continue to treat other medical issues.  5. Will follow briefly.

## 2018-04-12 NOTE — PT/OT/SLP EVAL
Physical Therapy Evaluation     Patient Name:  Christina Curtis   MRN:  [id number]     Recommendations:      Discharge Recommendations: SNF  Discharge Equipment Recommendations: shower chair and bedside commode  Barriers to discharge: Inaccessible home and Decreased caregiver support     Assessment:      Christina Curtis is a 86 y.o. female admitted with a medical diagnosis of syncope.  She presents with the following impairments/functional limitations:  weakness, impaired self care skills, impaired functional mobilty, gait instability, impaired balance, decreased safety awareness.     Rehab Prognosis:  Fair+; patient would benefit from acute skilled PT services to address these deficits and reach maximum level of function.       Recent Surgery: * No surgery found *       Plan:      During this hospitalization, patient to be seen 6 x/week (M-F, S or S) to address the above listed problems via gait training, therapeutic activities, therapeutic exercises  § Plan of Care Expires:  04/25/18  · Plan of Care Reviewed with: patient     Subjective      Patient found in bed upon PT entry to room, agreeable to evaluation.       Chief Complaint: weakness  Patient comments/goals: to get well   Pain/Comfort:  · Pain Rating 1: 0/10     Living Environment:  Pt reported living alone in an apartment on the 2nd floor with no elevators.  Pt has limited assistance from a niece.  Pt has no children.   Prior to admission, patients level of function was independent.  Patient has the following equipment: cane, straight, walker, rolling.  Upon discharge, patient will have limited assistance from niece.     Objective:      Patient found with: peripheral IV, telemetry, Av[employer] monitor      General Precautions: Standard, fall, contact, airborne (2* shingles)   Orthopedic Precautions:N/A   Braces: N/A      Exams:  · Cognitive Exam:  Patient was able to follow 2 step commands.  Pt was A+Ox3 (name, place, and time).     · Gross Motor  Coordination:  WFL  · Postural Exam:  Patient presented with the following abnormalities:    · -       No postural abnormalities identified  · Sensation:    · -       Intact  light/touch BLE  · Skin Integrity/Edema:      · -       Skin integrity: Visible skin intact; shingles to L trunk  · -       Edema: None noted BLE  · RLE ROM: WFL  · RLE Strength: WFL  · LLE ROM: WFL  · LLE Strength: WFL     Functional Mobility:  · Bed Mobility:     · Scooting: CGA  · Supine to Sit: CGA x 2 trials  · Sit to Supine: CGA x 2 trials  · Transfers:     · Sit to Stand:  contact guard assistance/Victor M with no AD and rolling walker x 4 trials  · Bed<>bedside commode: contact guard assistance/Victor M with no AD using  Step Transfer  · Gait: Pt ambulated ~30 ft with RW/CGA-Victor M.  Pt with decreased step length and uriah.  Pt with minimal unsteadiness.  Pt easily fatigued.   · Balance: Pt with fair/fair+ balance.    Therapeutic Activity:  BLE seated therex 10 reps: hip flex, LAQ, HS, and AP  BLE standing therex 2 sets of 10 reps with seated rest break bt sets: marches and calf raises.  Pt easily fatigued during standing therex.       AM-PAC 6 CLICK MOBILITY  Total Score:20                Patient left in bed with all lines intact, call button in reach, bed alarm on and nurse Maral notified.  Avasys monitor present     GOALS:          Physical Therapy Goals            Problem: Physical Therapy Goal      Goal Priority Disciplines Outcome Goal Variances Interventions   Physical Therapy Goal      PT/OT, PT         Description:  Goals to be met by: 18      Patient will increase functional independence with mobility by performin. Supine to sit with Modified [place]  2. Rolling to Left and Right with Modified [place]  3. Sit to stand transfer with Modified [place] using RW  4. Bed to chair transfer with Modified [place] using Rolling Walker  5. Gait  x 150 feet with Modified [place] using Rolling Walker   6. Lower  extremity exercise program x 30 reps per handout, with independence                           History:           Past Medical History:   Diagnosis Date    Allergic rhinitis, cause unspecified      Essential hypertension, benign      Hyperlipidemia      Hypertension      Unspecified vitamin D deficiency                 Past Surgical History:   Procedure Laterality Date    BACK SURGERY        HYSTERECTOMY             Clinical Decision Making:      History  Co-morbidities and personal factors that may impact the plan of care Examination  Body Structures and Functions, activity limitations and participation restrictions that may impact the plan of care Clinical Presentation    Decision Making/ Complexity Score   Co-morbidities:   [] Time since onset of injury / illness / exacerbation  [x] Status of current condition  [x]Patient's cognitive status and safety concerns    [] Multiple Medical Problems (see med hx)  Personal Factors:   [x] Patient's age  [] Prior Level of function   [x] Patient's home situation (environment and family support)  [] Patient's level of motivation  [] Expected progression of patient        HISTORY:(criteria)     [] 75457 - no personal factors/history     [] 43318 - has 1-2 personal factor/comorbidity      [x] 91438 - has >3 personal factor/comorbidity       Body Regions:  [] Objective examination findings  [] Head     []  Neck  [] Trunk   [] Upper Extremity  [] Lower Extremity     Body Systems:  [x] For communication ability, affect, cognition, language, and learning style: the assessment of the ability to make needs known, consciousness, orientation (person, place, and time), expected emotional /behavioral responses, and learning preferences (eg, learning barriers, education  needs)  [x] For the neuromuscular system: a general assessment of gross coordinated movement (eg, balance, gait, locomotion, transfers, and transitions) and motor function  (motor control and motor learning)  [x]  For the musculoskeletal system: the assessment of gross symmetry, gross range of motion, gross strength, height, and weight  [] For the integumentary system: the assessment of pliability(texture), presence of scar formation, skin color, and skin integrity  [] For cardiovascular/pulmonary system: the assessment of heart rate, respiratory rate, blood pressure, and edema      Activity limitations:    [x] Patient's cognitive status and saf ety concerns          [x] Status of current condition          [] Weight bearing restriction  [] Cardiopulmunary Restriction     Participation Restrictions:   [] Goals and goal agreement with the patient     [] Rehab potential (prognosis) and probable outcome       Examination of Body System: (criteria)     [] 81611 - addressing 1-2 elements     [x] 46550 - addressing a total of 3 or more elements      [] 20438 -  Addressing a total of 4 or more elements             Clinical Presentation: (criteria)                   On examination of body system using standardized tests and measures patient presents with  elements from any of the following: body structures and functions, activity limitations, and/or participation restrictions.  Leading to a clinical presentation that is considered                                             Clinical Decision Making  (Eval Complexity):        Time Tracking:      PT Received On: 04/11/18  PT Start Time: 1631     PT Stop Time: 1701  PT Total Time (min): 30 min      Billable Minutes: Evaluation 20 min and 10 min NORAH Aquino, PT  04/11/2018

## 2018-04-12 NOTE — PT/OT/SLP PROGRESS
"Physical Therapy Treatment    Patient Name:  Christina Curtis   MRN:  [id number]    Recommendations:     Discharge Recommendations:  nursing facility, skilled   Discharge Equipment Recommendations: bedside commode, shower chair   Barriers to discharge: Decreased caregiver support (pt lives alone), inaccessible home (lives on 2nd floor of Apartment)    Assessment:     Christina Curtis is a 86 y.o. female admitted with a medical diagnosis of Syncope.  She presents with the following impairments/functional limitations:  weakness, impaired endurance, impaired self care skills, impaired functional mobilty, impaired balance, gait instability, decreased coordination, decreased lower extremity function, decreased safety awareness, decreased ROM.  Pt performed BM with SUP, transfers with supervision, RW, and gait with RW, SBA.  Pt ambulated ~100ft with RW, SBA in room 2/2 being airborne.    Rehab Prognosis:  good; patient would benefit from acute skilled PT services to address these deficits and reach maximum level of function.      Recent Surgery: Procedure(s) (LRB):  ESOPHAGOGASTRODUODENOSCOPY (EGD) (N/A)      Plan:     During this hospitalization, patient to be seen 6 x/week (M-F; Sat or Sun) to address the above listed problems via gait training, therapeutic activities, therapeutic exercises  · Plan of Care Expires:  04/25/18   Plan of Care Reviewed with: patient    Subjective     Communicated with pt's nurse, Jovanni, prior to session.  Patient found supine with ARJUN Lyn upon PT entry to room, agreeable to treatment.      Chief Complaint: wanting to go home  Patient comments/goals: Pt states " I need to go home to pay my bills."  Pain/Comfort:  · Pain Rating 1: 0/10    Patients cultural, spiritual, Druze conflicts given the current situation:      Objective:     Patient found with: telemetry, peripheral IV     General Precautions: Standard, contact, fall, airborne (Shingles)   Orthopedic Precautions:N/A "   Braces: N/A     Functional Mobility:  · Bed Mobility:     · Scooting: supervision  · Supine to Sit: supervision  · Sit to Supine: supervision  · Transfers:     · Sit to Stand:  contact guard assistance with rolling walker  · Gait: Pt ambulated ~100ft with RW, SBA with decreased uriah, step length, velocity of limb, weight shifting ability, and postural control.  · Balance: good sitting and fair standing      AM-PAC 6 CLICK MOBILITY  Turning over in bed (including adjusting bedclothes, sheets and blankets)?: 4  Sitting down on and standing up from a chair with arms (e.g., wheelchair, bedside commode, etc.): 3  Moving from lying on back to sitting on the side of the bed?: 4  Moving to and from a bed to a chair (including a wheelchair)?: 3  Need to walk in hospital room?: 3  Climbing 3-5 steps with a railing?: 3  Total Score: 20       Therapeutic Activities and Exercises:   Pt performed standing therex to BLEs x15 reps AROM including: marching, hamstring curls, hip abduction, heel raises, toe raises(with LOB x1 with min A for recovery)    Patient left supine with all lines intact, call button in reach, bed alarm on, pt's nurse, Jovanni, notified and AVASYS present..    GOALS:    Physical Therapy Goals        Problem: Physical Therapy Goal    Goal Priority Disciplines Outcome Goal Variances Interventions   Physical Therapy Goal     PT/OT, PT      Description:  Goals to be met by: 18     Patient will increase functional independence with mobility by performin. Supine to sit with Modified [place]  2. Rolling to Left and Right with Modified [place]  3. Sit to stand transfer with Modified [place] using RW  4. Bed to chair transfer with Modified [place] using Rolling Walker  5. Gait  x 150 feet with Modified [place] using Rolling Walker   6. Lower extremity exercise program x 30 reps per handout, with independence                      Time Tracking:     PT Received On: 18  PT  Start Time: 1334     PT Stop Time: 1400  PT Total Time (min): 26 min     Billable Minutes: Gait Training 13 and Therapeutic Exercise 13    Treatment Type: Treatment  PT/PTA: PTA     PTA Visit Number: 1     Usha Ware PTA  04/12/2018
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
## 2018-04-12 NOTE — PLAN OF CARE
Problem: Physical Therapy Goal  Goal: Physical Therapy Goal  Goals to be met by: 18     Patient will increase functional independence with mobility by performin. Supine to sit with Modified [place]  2. Rolling to Left and Right with Modified [place]  3. Sit to stand transfer with Modified [place] using RW  4. Bed to chair transfer with Modified [place] using Rolling Walker  5. Gait  x 150 feet with Modified [place] using Rolling Walker   6. Lower extremity exercise program x 30 reps per handout, with independence     Outcome: Ongoing (interventions implemented as appropriate)  Pt ambulated ~100ft with RW, SBA with no LOB noted.  Pt requires assistance for all OOB mobility 2/2 safety.

## 2018-04-12 NOTE — SUBJECTIVE & OBJECTIVE
Past Medical History:   Diagnosis Date    Allergic rhinitis, cause unspecified     Essential hypertension, benign     Hyperlipidemia     Hypertension     Shingles     Unspecified vitamin D deficiency        Past Surgical History:   Procedure Laterality Date    BACK SURGERY      HYSTERECTOMY         Review of patient's allergies indicates:   Allergen Reactions    Codeine     Darvon [propoxyphene]     Excedrin migraine [aspirin-acetaminophen-caffeine]     Zoloft [sertraline]        No current facility-administered medications on file prior to encounter.      Current Outpatient Prescriptions on File Prior to Encounter   Medication Sig    benzonatate (TESSALON) 100 MG capsule     NAMENDA 10 mg Tab Take 10 mg by mouth 2 (two) times daily.    RANEXA 1,000 mg Tb12     acetaminophen (TYLENOL) 500 MG tablet Take 500 mg by mouth every 6 (six) hours as needed for Pain.    atorvastatin (LIPITOR) 10 MG tablet Take 10 mg by mouth once daily.    benazepril (LOTENSIN) 10 MG tablet Take 1 tablet (10 mg total) by mouth once daily.    cefUROXime (CEFTIN) 500 MG tablet Take 500 mg by mouth 2 (two) times daily.    ergocalciferol (ERGOCALCIFEROL) 50,000 unit Cap Take 50,000 Units by mouth every 7 days.    gabapentin (NEURONTIN) 100 MG capsule Take 100 mg by mouth 3 (three) times daily.    guaifenesin (MUCINEX) 600 mg 12 hr tablet Take 1,200 mg by mouth 2 (two) times daily.    guaifenesin 100 mg/5 ml (ROBITUSSIN) 100 mg/5 mL syrup Take 200 mg by mouth 3 (three) times daily as needed for Cough.    isosorbide mononitrate (IMDUR) 30 MG 24 hr tablet Take 30 mg by mouth once daily.    pantoprazole (PROTONIX) 40 MG tablet Take 40 mg by mouth once daily.    potassium chloride (MICRO-K) 10 MEQ CpSR Take 10 mEq by mouth once daily.    traMADol (ULTRAM) 50 mg tablet Take 1 tablet (50 mg total) by mouth every 6 (six) hours as needed for Pain (9-10/10 pain).    valACYclovir (VALTREX) 1000 MG tablet Take 1 tablet (1,000 mg  total) by mouth 3 (three) times daily.     Family History     None        Social History Main Topics    Smoking status: Never Smoker    Smokeless tobacco: Never Used    Alcohol use No    Drug use: No    Sexual activity: No     Review of Systems   Constitutional: Negative for chills and fever.   HENT: Negative for sore throat.    Eyes: Negative for visual disturbance.   Respiratory: Negative for shortness of breath.    Cardiovascular: Negative for chest pain.   Gastrointestinal: Positive for anal bleeding and blood in stool. Negative for abdominal distention, abdominal pain, nausea and vomiting.   Endocrine: Negative for polyuria.   Genitourinary: Negative for dysuria.   Musculoskeletal: Negative for arthralgias.   Skin: Negative for rash.   Neurological: Positive for syncope.   Hematological: Does not bruise/bleed easily.   Psychiatric/Behavioral: Negative for behavioral problems.     Objective:     Vital Signs (Most Recent):  Temp: 98.5 °F (36.9 °C) (04/11/18 2044)  Pulse: 69 (04/11/18 2044)  Resp: 18 (04/11/18 2044)  BP: (!) 124/58 (04/11/18 2044)  SpO2: 97 % (04/11/18 2044) Vital Signs (24h Range):  Temp:  [97 °F (36.1 °C)-98.6 °F (37 °C)] 98.5 °F (36.9 °C)  Pulse:  [60-76] 69  Resp:  [16-20] 18  SpO2:  [95 %-100 %] 97 %  BP: ()/(47-65) 124/58     Weight: 55.8 kg (123 lb)  Body mass index is 22.5 kg/m².    Physical Exam   Constitutional: She is oriented to person, place, and time. She appears well-developed. No distress.   HENT:   Head: Normocephalic and atraumatic.   Eyes: EOM are normal. Pupils are equal, round, and reactive to light.   Neck: Normal range of motion. Neck supple.   Cardiovascular: Normal rate and regular rhythm.    Pulmonary/Chest: Effort normal and breath sounds normal.   Abdominal: Soft. Bowel sounds are normal.   Musculoskeletal: Normal range of motion.   Neurological: She is alert and oriented to person, place, and time.   Skin: Capillary refill takes less than 2 seconds. She is  not diaphoretic.   Left axilla crusted over lesions in dermatome distribution   Psychiatric: Her speech is delayed. She is slowed.         CRANIAL NERVES     CN III, IV, VI   Pupils are equal, round, and reactive to light.  Extraocular motions are normal.        Significant Labs: All pertinent labs within the past 24 hours have been reviewed.    Significant Imaging: I have reviewed and interpreted all pertinent imaging results/findings within the past 24 hours.

## 2018-04-12 NOTE — PLAN OF CARE
Problem: Patient Care Overview  Goal: Plan of Care Review  Outcome: Ongoing (interventions implemented as appropriate)   04/12/18 0642   Coping/Psychosocial   Plan Of Care Reviewed With patient   Alert and oriented to person and place att. Resp even and unlabored. Pt has remained free of falls this shift. Denies pain. Ivf in progress with iv protonix given per order. Pt co of chest pain this am to midsternal area and under R breast area. Dr Garcia notified with new orders noted. ekg in progress.

## 2018-04-12 NOTE — HPI
85 y/o female with HTN, HLP, GERD, and history of CVA who was recently admitted for observation for chest pain with shingles and with syncope from 4/8-4/10/18 who was brought back by family (niece) after sustaining another syncopal episode while going home in the car. After arrival back in the ER, patient passed out again after she insisted on using the [place] restroom. She denied any trauma to her head.  She then had painless bright red, moderate amount of blood seen in toilet bowel while in the ER and blood pressure was slightly lower. She was given IVF with increase in BP.  No heavy NSAID use and previous colonoscopy few years ago. In the ER, BUN/creatinine were increased to 46/2.1 which was an acute change; she was hemoccult +. No repeat head CT was done since she has one on 4/9/2018 which was negative for any acute findings.

## 2018-04-12 NOTE — H&P
[facility] Medicine  History & Physical    Patient Name: Christina Curtis  MRN: [id number]  Admission Date: 4/10/2018  Attending Physician: Sonja Jurado MD   Primary Care Provider: Neetu Mcelroy MD         Patient information was obtained from patient, past medical records and ER records.     Subjective:     Principal Problem:Syncope    Chief Complaint:   Chief Complaint   Patient presents with    Generalized Weakness     pt c/o generalized weakness; pt hypotensive 70/42 on scene with EMS; pt recieved 500ml NS bolus and now 110/50; pt was just discharged from here a few hours prior with shingles        HPI: 85 y/o female with HTN, HLP, GERD, and history of CVA who was recently admitted for observation for chest pain with shingles and with syncope from 4/8-4/10/18 who was brought back by family (niece) after sustaining another syncopal episode while going home in the car. After arrival back in the ER, patient passed out again after she insisted on using the [place] restroom. She denied any trauma to her head.  She then had painless bright red, moderate amount of blood seen in toilet bowel while in the ER and blood pressure was slightly lower. She was given IVF with increase in BP.  No heavy NSAID use and previous colonoscopy few years ago. In the ER, BUN/creatinine were increased to 46/2.1 which was an acute change; she was hemoccult +. No repeat head CT was done since she has one on 4/9/2018 which was negative for any acute findings.       Past Medical History:   Diagnosis Date    Allergic rhinitis, cause unspecified     Essential hypertension, benign     Hyperlipidemia     Hypertension     Shingles     Unspecified vitamin D deficiency        Past Surgical History:   Procedure Laterality Date    BACK SURGERY      HYSTERECTOMY         Review of patient's allergies indicates:   Allergen Reactions    Codeine     Darvon [propoxyphene]     Excedrin migraine  [aspirin-acetaminophen-caffeine]     Zoloft [sertraline]        No current facility-administered medications on file prior to encounter.      Current Outpatient Prescriptions on File Prior to Encounter   Medication Sig    benzonatate (TESSALON) 100 MG capsule     NAMENDA 10 mg Tab Take 10 mg by mouth 2 (two) times daily.    RANEXA 1,000 mg Tb12     acetaminophen (TYLENOL) 500 MG tablet Take 500 mg by mouth every 6 (six) hours as needed for Pain.    atorvastatin (LIPITOR) 10 MG tablet Take 10 mg by mouth once daily.    benazepril (LOTENSIN) 10 MG tablet Take 1 tablet (10 mg total) by mouth once daily.    cefUROXime (CEFTIN) 500 MG tablet Take 500 mg by mouth 2 (two) times daily.    ergocalciferol (ERGOCALCIFEROL) 50,000 unit Cap Take 50,000 Units by mouth every 7 days.    gabapentin (NEURONTIN) 100 MG capsule Take 100 mg by mouth 3 (three) times daily.    guaifenesin (MUCINEX) 600 mg 12 hr tablet Take 1,200 mg by mouth 2 (two) times daily.    guaifenesin 100 mg/5 ml (ROBITUSSIN) 100 mg/5 mL syrup Take 200 mg by mouth 3 (three) times daily as needed for Cough.    isosorbide mononitrate (IMDUR) 30 MG 24 hr tablet Take 30 mg by mouth once daily.    pantoprazole (PROTONIX) 40 MG tablet Take 40 mg by mouth once daily.    potassium chloride (MICRO-K) 10 MEQ CpSR Take 10 mEq by mouth once daily.    traMADol (ULTRAM) 50 mg tablet Take 1 tablet (50 mg total) by mouth every 6 (six) hours as needed for Pain (9-10/10 pain).    valACYclovir (VALTREX) 1000 MG tablet Take 1 tablet (1,000 mg total) by mouth 3 (three) times daily.     Family History     None        Social History Main Topics    Smoking status: Never Smoker    Smokeless tobacco: Never Used    Alcohol use No    Drug use: No    Sexual activity: No     Review of Systems   Constitutional: Negative for chills and fever.   HENT: Negative for sore throat.    Eyes: Negative for visual disturbance.   Respiratory: Negative for shortness of breath.     Cardiovascular: Negative for chest pain.   Gastrointestinal: Positive for anal bleeding and blood in stool. Negative for abdominal distention, abdominal pain, nausea and vomiting.   Endocrine: Negative for polyuria.   Genitourinary: Negative for dysuria.   Musculoskeletal: Negative for arthralgias.   Skin: Negative for rash.   Neurological: Positive for syncope.   Hematological: Does not bruise/bleed easily.   Psychiatric/Behavioral: Negative for behavioral problems.     Objective:     Vital Signs (Most Recent):  Temp: 98.5 °F (36.9 °C) (04/11/18 2044)  Pulse: 69 (04/11/18 2044)  Resp: 18 (04/11/18 2044)  BP: (!) 124/58 (04/11/18 2044)  SpO2: 97 % (04/11/18 2044) Vital Signs (24h Range):  Temp:  [97 °F (36.1 °C)-98.6 °F (37 °C)] 98.5 °F (36.9 °C)  Pulse:  [60-76] 69  Resp:  [16-20] 18  SpO2:  [95 %-100 %] 97 %  BP: ()/(47-65) 124/58     Weight: 55.8 kg (123 lb)  Body mass index is 22.5 kg/m².    Physical Exam   Constitutional: She is oriented to person, place, and time. She appears well-developed. No distress.   HENT:   Head: Normocephalic and atraumatic.   Eyes: EOM are normal. Pupils are equal, round, and reactive to light.   Neck: Normal range of motion. Neck supple.   Cardiovascular: Normal rate and regular rhythm.    Pulmonary/Chest: Effort normal and breath sounds normal.   Abdominal: Soft. Bowel sounds are normal.   Musculoskeletal: Normal range of motion.   Neurological: She is alert and oriented to person, place, and time.   Skin: Capillary refill takes less than 2 seconds. She is not diaphoretic.   Left axilla crusted over lesions in dermatome distribution   Psychiatric: Her speech is delayed. She is slowed.         CRANIAL NERVES     CN III, IV, VI   Pupils are equal, round, and reactive to light.  Extraocular motions are normal.        Significant Labs: All pertinent labs within the past 24 hours have been reviewed.    Significant Imaging: I have reviewed and interpreted all pertinent imaging  results/findings within the past 24 hours.    Assessment/Plan:     * Syncope    Likely due to hypotension associated with volume loss  However, cannot r/o vasovagal given temporal relationship with bowel movements  Treat with IVF, hold anti-HTN medications and workup GIB  Will consult GI and Neurology  Monitor on telemetry  No need to repeat head CT or ECHO        GIB (gastrointestinal bleeding)    Pt with painless hematochezia  Possible diverticular bleed which may have self resolved  Appreciate GI input and will monitor with serial labs  Continue PPI        Acute renal failure    Likely prerenal with hypotension  Treat with IVF and monitor, already improving        Shingles    Resolving nicely with valacyclovir, to complete full course  Contact precautions but now lesions are crusted        History of CVA (cerebrovascular accident)    On statin, but will hold ASA for now due to bleeding        Dementia with behavioral disturbance    Mentation at baseline and very mild cognitive impairment  Continue namenda        GERD (gastroesophageal reflux disease)    Continue PPI        Hyperlipidemia    Continue statin        Essential hypertension    Hold anti-HTN medications for now          VTE Risk Mitigation         Ordered     IP VTE HIGH RISK PATIENT  Once      04/11/18 0318     Reason for No Pharmacological VTE Prophylaxis  Once      04/11/18 0318     Place KATALINA hose  Until discontinued      04/11/18 0318     Place sequential compression device  Until discontinued      04/11/18 0318             Sonja Jurado MD  Department of Hospital Medicine   [facility]

## 2018-04-12 NOTE — PLAN OF CARE
Problem: Occupational Therapy Goal  Goal: Occupational Therapy Goal  Goals to be met by: 4/19/2018     Patient will increase functional independence with ADLs by performing:    UE Dressing with Emanuel.  LE Dressing with Set-up Assistance.  Grooming while standing at sink with Stand-by Assistance.  Toileting from toilet with Stand-by Assistance for hygiene and clothing management.   Toilet transfer to toilet with Stand-by Assistance.  Upper extremity exercise program with assistance as needed.    Outcome: Ongoing (interventions implemented as appropriate)  Patient tolerated evaluation well, good participation.  Patient will benefit from skilled OT services to address the above mentioned goals. CHELI Ly, MS

## 2018-04-12 NOTE — ASSESSMENT & PLAN NOTE
Pt with painless hematochezia  Possible diverticular bleed which may have self resolved  Appreciate GI input and will monitor with serial labs  Continue PPI

## 2018-04-12 NOTE — PLAN OF CARE
Problem: Physical Therapy Goal  Goal: Physical Therapy Goal  Goals to be met by: 18     Patient will increase functional independence with mobility by performin. Supine to sit with Modified Lucas  2. Rolling to Left and Right with Modified Lucas  3. Sit to stand transfer with Modified Lucas using RW  4. Bed to chair transfer with Modified Lucas using Rolling Walker  5. Gait  x 150 feet with Modified Lucas using Rolling Walker   6. Lower extremity exercise program x 30 reps per handout, with independence    Pt may benefit from SNF.

## 2018-04-12 NOTE — ASSESSMENT & PLAN NOTE
Resolving nicely with valacyclovir, to complete full course  Contact precautions but now lesions are crusted

## 2018-04-12 NOTE — NURSING
Alert and oriented to person and place att. Resp even and unlabored. Pt has remained free of falls this shift. Denies pain. Ivf in progress with iv protonix given per order. Pt co of chest pain this am to midsternal area and under R breast area. Dr Garcia notified with new orders noted. ekg in progress.

## 2018-04-12 NOTE — PROGRESS NOTES
SW spoke with pt at bedside. SW inquired about help at home. Pt stated that she lives alone but has her niece when available. SW inquired if pt would possibly want to be consider SNF placement for a couple of weeks for strength and provide agencies of preference. Pt stated that she would not mind going to SNF but she has some bills that she would need to pay before hand. SW inquired if pt's niece would be able to pay her bills while she went to SNF for a couple of weeks. Pt stated that she would think about it but she would prefer to go home and have home health instead. SW voiced understanding. Pt stated that she will think about it but at this time she would prefer to go home with home health.

## 2018-04-12 NOTE — ASSESSMENT & PLAN NOTE
Likely due to hypotension associated with volume loss  However, cannot r/o vasovagal given temporal relationship with bowel movements  Treat with IVF, hold anti-HTN medications and workup GIB  Will consult GI and Neurology  Monitor on telemetry  No need to repeat head CT or ECHO

## 2018-04-12 NOTE — PT/OT/SLP EVAL
"Occupational Therapy   Evaluation    Name: Christina Curtis  MRN: [id number]  Admitting Diagnosis:  Syncope      Recommendations:     Discharge Recommendations: nursing facility, skilled  Discharge Equipment Recommendations:  bedside commode, shower chair  Barriers to discharge:  Decreased caregiver support, Inaccessible home environment    History:     Occupational Profile:  Living Environment: lives alone with assistance from neighbors  Previous level of function: modified independent  Roles and Routines: homemaker  Equipment Owned:  cane, straight, walker, rolling  Assistance upon Discharge: from friends, may go to SNF    Past Medical History:   Diagnosis Date    Allergic rhinitis, cause unspecified     Essential hypertension, benign     Hyperlipidemia     Hypertension     Shingles     Unspecified vitamin D deficiency        Past Surgical History:   Procedure Laterality Date    BACK SURGERY      HYSTERECTOMY         Subjective     Chief Complaint: "I feel a little stronger today."  Patient/Family stated goals: to return home  Communicated with: nursing prior to session.  Pain/Comfort:  · Pain Rating 1: 0/10    Patients cultural, spiritual, Spiritism conflicts given the current situation: none    Objective:     Patient found with: telemetry, peripheral IV    General Precautions: Standard, contact, fall, airborne (Shingles)   Orthopedic Precautions:N/A   Braces: N/A     Occupational Performance:    Bed Mobility:    · Patient completed Rolling/Turning to Left with  supervision  · Patient completed Rolling/Turning to Right with supervision  · Patient completed Scooting/Bridging with stand by assistance  · Patient completed Supine to Sit with stand by assistance  · Patient completed Sit to Supine with stand by assistance    Functional Mobility/Transfers:  · Patient completed Sit <> Stand Transfer with contact guard assistance  with  no assistive device   · Functional Mobility: NT    Activities of Daily " "Living:  · Feeding:  modified independence    · Grooming: contact guard assistance    · UB Dressing: minimum assistance      Cognitive/Visual Perceptual:  Cognitive/Psychosocial Skills:     -       Oriented to: Person and Situation   -       Follows Commands/attention:Easily distracted  -       Communication: clear/fluent  -       Memory: No Deficits noted  -       Safety awareness/insight to disability: impaired   -       Mood/Affect/Coping skills/emotional control: Appropriate to situation    Physical Exam:  Balance:    -       sit balance fair plus; standing balance fair  Postural examination/scapula alignment:    -       Rounded shoulders  Skin integrity: Intact  Upper Extremity Range of Motion:     -       Right Upper Extremity: WFL  -       Left Upper Extremity: WFL  Upper Extremity Strength:    -       Right Upper Extremity: WFL  -       Left Upper Extremity: WFL    Patient left supine with all lines intact, call button in reach and bed alarm on    AMPA 6 Click:  [facility] Total Score: 20    Treatment & Education:  Evaluation    Education:    Assessment:     Christina Curtis is a 86 y.o. female with a medical diagnosis of Syncope.  She presents with  independence for self-care and functional transfers.  Performance deficits affecting function are weakness, impaired endurance, decreased upper extremity function, impaired cardiopulmonary response to activity, impaired self care skills, impaired functional mobilty.      Rehab Prognosis:  good; patient would benefit from acute skilled OT services to address these deficits and reach maximum level of function.         Clinical Decision Makin.  OT Low:  "Pt evaluation falls under low complexity for evaluation coding due to performance deficits noted in 1-3 areas as stated above and 0 co-morbities affecting current functional status. Data obtained from problem focused assessments. No modifications or assistance was required for completion of " "evaluation. Only brief occupational profile and history review completed."     Plan:     Patient to be seen 3 x/week to address the above listed problems via self-care/home management, therapeutic activities, therapeutic exercises  · Plan of Care Expires: 04/19/18  · Plan of Care Reviewed with: patient    This Plan of care has been discussed with the patient who was involved in its development and understands and is in agreement with the identified goals and treatment plan    GOALS:    Occupational Therapy Goals        Problem: Occupational Therapy Goal    Goal Priority Disciplines Outcome Interventions   Occupational Therapy Goal     OT, PT/OT Ongoing (interventions implemented as appropriate)    Description:  Goals to be met by: 4/19/2018     Patient will increase functional independence with ADLs by performing:    UE Dressing with Sawyer.  LE Dressing with Set-up Assistance.  Grooming while standing at sink with Stand-by Assistance.  Toileting from toilet with Stand-by Assistance for hygiene and clothing management.   Toilet transfer to toilet with Stand-by Assistance.  Upper extremity exercise program with assistance as needed.                      Time Tracking:     OT Date of Treatment: 04/12/18  OT Start Time: 1630  OT Stop Time: 1640  OT Total Time (min): 10 min    Billable Minutes:Evaluation 10 minutes    CHELI Ly, MS  4/12/2018    "

## 2018-04-13 VITALS
HEART RATE: 63 BPM | SYSTOLIC BLOOD PRESSURE: 141 MMHG | RESPIRATION RATE: 16 BRPM | WEIGHT: 123 LBS | HEIGHT: 62 IN | TEMPERATURE: 97 F | OXYGEN SATURATION: 98 % | DIASTOLIC BLOOD PRESSURE: 65 MMHG | BODY MASS INDEX: 22.63 KG/M2

## 2018-04-13 LAB — BACTERIA UR CULT: NO GROWTH

## 2018-04-13 PROCEDURE — G8979 MOBILITY GOAL STATUS: HCPCS | Mod: CH

## 2018-04-13 PROCEDURE — G8978 MOBILITY CURRENT STATUS: HCPCS | Mod: CJ

## 2018-04-13 PROCEDURE — 97110 THERAPEUTIC EXERCISES: CPT

## 2018-04-13 PROCEDURE — G8980 MOBILITY D/C STATUS: HCPCS | Mod: CJ

## 2018-04-13 PROCEDURE — 25000003 PHARM REV CODE 250: Performed by: HOSPITALIST

## 2018-04-13 PROCEDURE — C9113 INJ PANTOPRAZOLE SODIUM, VIA: HCPCS | Performed by: HOSPITALIST

## 2018-04-13 PROCEDURE — 25000003 PHARM REV CODE 250: Performed by: EMERGENCY MEDICINE

## 2018-04-13 PROCEDURE — 94761 N-INVAS EAR/PLS OXIMETRY MLT: CPT

## 2018-04-13 PROCEDURE — 97116 GAIT TRAINING THERAPY: CPT

## 2018-04-13 PROCEDURE — 63600175 PHARM REV CODE 636 W HCPCS: Performed by: HOSPITALIST

## 2018-04-13 RX ADMIN — DEXTROSE 40 MG: 50 INJECTION, SOLUTION INTRAVENOUS at 08:04

## 2018-04-13 RX ADMIN — GABAPENTIN 100 MG: 100 CAPSULE ORAL at 08:04

## 2018-04-13 RX ADMIN — MEMANTINE 10 MG: 10 TABLET ORAL at 08:04

## 2018-04-13 RX ADMIN — RANOLAZINE 1000 MG: 500 TABLET, FILM COATED, EXTENDED RELEASE ORAL at 08:04

## 2018-04-13 RX ADMIN — VALACYCLOVIR HYDROCHLORIDE 1000 MG: 500 TABLET, FILM COATED ORAL at 08:04

## 2018-04-13 NOTE — PLAN OF CARE
Problem: Physical Therapy Goal  Goal: Physical Therapy Goal  Goals to be met by: 18     Patient will increase functional independence with mobility by performin. Supine to sit with Modified McDonald  2. Rolling to Left and Right with Modified McDonald  3. Sit to stand transfer with Modified McDonald using RW  4. Bed to chair transfer with Modified McDonald using Rolling Walker  5. Gait  x 150 feet with Modified McDonald using Rolling Walker   6. Lower extremity exercise program x 30 reps per handout, with independence     Outcome: Ongoing (interventions implemented as appropriate)  Pt tolerated treatment well. Pt will benefit from further therapy in order to get back to PLOF.

## 2018-04-13 NOTE — NURSING
Patient discharged to home with niece per wheelchair.  No apparent distress, pain or SOB.  No dizziness or light headed.

## 2018-04-13 NOTE — PLAN OF CARE
Problem: Patient Care Overview  Goal: Plan of Care Review  Outcome: Ongoing (interventions implemented as appropriate)  Patient is A/Ox4, remains free from falls, is afebrile, and states no pain.  Patient is tolerating diet well, no complaints of N/V or cramping, no signs of blood in stools.  Patient walks to restroom with standby assistance.

## 2018-04-13 NOTE — PLAN OF CARE
"SW met with pt at bedside. SW reviewed discharge education sheet "Stroke" with pt. Pt voiced understanding. Teachback method applied with pt. SW also reviewed with pt what she is responsible for in order to manage her health at home.     1) Getting medications taking from pharmacy.  2) Taking medications as prescribed.  3) Making Appointments that are scheduled.    Pt verbalized understanding. SW inquired what pt is is responsible for. Pt stated that she is needs to take her medications and make it to her doctor appointments. Pt voiced understanding.       04/13/18 1524   Final Note   Assessment Type Final Discharge Note   Discharge Disposition Home-Health   What phone number can be called within the next 1-3 days to see how you are doing after discharge? [phone]   Hospital Follow Up  Appt(s) scheduled? Yes   Discharge plans and expectations educations in teach back method with documentation complete? Yes   Right Care Referral Info   Post Acute Recommendation Home-care   Referral Type Home health    Facility Name [facility]       "

## 2018-04-13 NOTE — PT/OT/SLP PROGRESS
"Physical Therapy Treatment    Patient Name:  Christina Curtis   MRN:  [id number]    Recommendations:     Discharge Recommendations:  nursing facility, skilled   Discharge Equipment Recommendations:   bedside commode, shower chair    Barriers to discharge: Decreased caregiver support (pt lives alone), inaccessible home (lives on 2nd floor of Apartment)    Assessment:     Christina Curtis is a 86 y.o. female admitted with a medical diagnosis of Syncope.  She presents with the following impairments/functional limitations:  weakness, impaired self care skills, decreased lower extremity function, decreased upper extremity function, gait instability, decreased safety awareness . Pt will benefit from further therapy in order to get back to PLOF.    Rehab Prognosis:  Fair+; patient would benefit from acute skilled PT services to address these deficits and reach maximum level of function.      Recent Surgery: Procedure(s) (LRB):  ESOPHAGOGASTRODUODENOSCOPY (EGD) (N/A)      Plan:     During this hospitalization, patient to be seen 6 x/week (M-F; Sat or Sun) to address the above listed problems via gait training, therapeutic activities, therapeutic exercises  · Plan of Care Expires:  04/25/18   Plan of Care Reviewed with: patient    Subjective     Communicated with nurse Beltre prior to session.  Patient found in supine in bed upon PT entry to room, agreeable to treatment.      Chief Complaint:  n/a  Patient comments/goals: " I have to go home "  Pain/Comfort:  · Pain Rating 1: 0/10  · Pain Rating Post-Intervention 1: 0/10    Patients cultural, spiritual, Latter day conflicts given the current situation:      Objective:     Patient found with: telemetry, peripheral IV     General Precautions: Standard, fall, contact   Orthopedic Precautions:N/A   Braces:       Functional Mobility:  · Bed Mobility:     · Rolling Left:  modified independence  · Scooting: supervision  · Bridging: supervision  · Supine to Sit: " supervision  · Sit to Supine: supervision  · Transfers:     · Sit to Stand: x 3 trials from bed stand by assistance with rolling walker. VC's for safety technique   · Gait:    Patient ambulated  ~ 50 ft within room ( 2* airborn precaution ) on level tile with Rolling Walker with Stand-by Assistance.  Pt with demonstarting a  swing-through gait with decreased uriah, increased time in double stance, decreased velocity of limb motion and decreased step length.Impairments contributing to gait deviations include decreased ROM and decreased strength.     · Balance:  Good with sitting balance, fair + with standing balance       AM-PAC 6 CLICK MOBILITY  Turning over in bed (including adjusting bedclothes, sheets and blankets)?: 4  Sitting down on and standing up from a chair with arms (e.g., wheelchair, bedside commode, etc.): 4  Moving from lying on back to sitting on the side of the bed?: 4  Moving to and from a bed to a chair (including a wheelchair)?: 4  Need to walk in hospital room?: 4  Climbing 3-5 steps with a railing?: 3  Total Score: 23       Therapeutic Activities and Exercises:   pt performed bed mobility, transfer and gait training .   Pt performed seated BLE x 15 reps : AP, LAQ, HS, pillow squeezes and standing BLE x 20 reps : heel/toes raises, marching in place using RW CGA. Pt tolerated well.   Educated pt on safety awareness with all OOB mobility, transfer and gait training, pt verbalize understanding.     Patient left HOB elevated , bed alarm on with all lines intact, call button in reach, bed alarm on, nurse notified and [employer] monitor present.     GOALS:    Physical Therapy Goals        Problem: Physical Therapy Goal    Goal Priority Disciplines Outcome Goal Variances Interventions   Physical Therapy Goal     PT/OT, PT Ongoing (interventions implemented as appropriate)     Description:  Goals to be met by: 18     Patient will increase functional independence with mobility by performin.  Supine to sit with Modified Gates  2. Rolling to Left and Right with Modified Gates  3. Sit to stand transfer with Modified Gates using RW  4. Bed to chair transfer with Modified Gates using Rolling Walker  5. Gait  x 150 feet with Modified Gates using Rolling Walker   6. Lower extremity exercise program x 30 reps per handout, with independence                      Time Tracking:     PT Received On: 04/13/18  PT Start Time: 1415     PT Stop Time: 1440  PT Total Time (min): 25 min     Billable Minutes: Gait Training 14 and Therapeutic Exercise 11    Treatment Type: Treatment  PT/PTA: PTA     PTA Visit Number: 2     Tram T Angélica, PTA  04/13/2018

## 2018-04-13 NOTE — PROGRESS NOTES
WRITTEN HEALTHCARE DISCHARGE INFORMATION     Things that YOU are responsible for to Manage Your Care At Home:  1. Getting your prescriptions filled.  2. Taking you medications as directed. DO NOT MISS ANY DOSES!  3. Going to your follow-up doctor appointments. This is important because it allows the doctor to monitor your progress and to determine if any changes need to be made to your treatment plan.    If you are unable to make your follow up appointments, please call the number listed and reschedule this appointment.     After discharge, if you need assistance, you can call [facility] Line for 24/7 assistance at [phone]    If you are experience any signs or symptoms, Call your doctor or Call 911 and come to your nearest Emergency Room.    Thank you for choosing [facility] and allowing us to care for you.   From your care management team: Jessica CALVO,RSW [phone]     You should receive a call from [facility] within 48-72 hours to help manage your care after discharge. Please try to make sure that you answer your phone for this important phone call.     Follow-up Information     Asa Mccarty MD. Go on 5/15/2018.    Specialty:  Neurology  Why:  Tuesday at 2:40PM  Contact information:  [address] [facility]  [address]  [facility] [address]  [phone]             Neetu Mcelroy MD. Call in 1 week.    Specialty:  Internal Medicine  Why:  Call to schedule Appointment within 1 week of discharge.  Contact information:  [address] [place] [address]  [address]  [facility] [place] [address]  [phone]             [facility].    Specialty:  Home Health Services  Why:  Home Health: Skilled Nursing, Physical Therapy and Occupational Therapy  Contact information:  [address] [facility] [address]  [phone]                 .

## 2018-04-13 NOTE — PLAN OF CARE
[facility]    HOME HEALTH ORDERS  FACE TO FACE ENCOUNTER    Patient Name: Christina Curtis  YOB: 1931    PCP: Neetu Mcelroy MD   PCP Address: [address] [place] [address] [address] / [place] [address]  PCP Phone Number: [phone]  PCP Fax: [phone]    Encounter Date: 04/13/2018    Admit to Home Health    Diagnoses:  Active Hospital Problems    Diagnosis  POA    *Syncope [R55]  Yes     Priority: 1 - High    GIB (gastrointestinal bleeding) [K92.2]  Yes     Priority: 2     Acute renal failure [N17.9]  Yes     Priority: 3     History of CVA (cerebrovascular accident) [Z86.73]  Not Applicable     Chronic    Shingles [B02.9]  Yes    Essential hypertension [I10]  Yes     Chronic    Hyperlipidemia [E78.5]  Yes     Chronic    GERD (gastroesophageal reflux disease) [K21.9]  Yes     Chronic    Dementia with behavioral disturbance [F03.91]  Yes     Chronic      Resolved Hospital Problems    Diagnosis Date Resolved POA   No resolved problems to display.       Future Appointments  Date Time Provider Department Center   5/15/2018 2:40 PM Asa Mccarty MD [facility] NEUR[facility]     Follow-up Information     Asa Mccarty MD. Go on 5/15/2018.    Specialty:  Neurology  Why:  Tuesday at 2:40PM  Contact information:  [address] [place] [address]  [address]  [facility] [id number] [phone]             Neetu Mcelroy MD In 1 week.    Specialty:  Internal Medicine  Contact information:  [address] [place] [address]  [address]  [place] [address]  [phone]                     I have seen and examined this patient face to face today. My clinical findings that support the need for the home health skilled services and home bound status are the following:  Weakness/numbness causing balance and gait disturbance due to Weakness/Debility and Dehydration making it taxing to leave home.    Allergies:  Review of patient's allergies indicates:   Allergen Reactions    Codeine     Darvon  [propoxyphene]     Excedrin migraine [aspirin-acetaminophen-caffeine]     Zoloft [sertraline]        Diet: cardiac diet    Activities: activity as tolerated, no driving    Nursing:   SN to complete comprehensive assessment including routine vital signs. Instruct on disease process and s/s of complications to report to MD. Review/verify medication list sent home with the patient at time of discharge  and instruct patient/caregiver as needed. Frequency may be adjusted depending on start of care date.    Notify MD if SBP > 160 or < 90; DBP > 90 or < 50; HR > 120 or < 50; Temp > 101.      CONSULTS:    Physical Therapy to evaluate and treat. Evaluate for home safety and equipment needs; Establish/upgrade home exercise program. Perform / instruct on therapeutic exercises, gait training, transfer training, and Range of Motion.  Occupational Therapy to evaluate and treat. Evaluate home environment for safety and equipment needs. Perform/Instruct on transfers, ADL training, ROM, and therapeutic exercises.   to evaluate for community resources/long-range planning.  Aide to provide assistance with personal care, ADLs, and vital signs.      Medications: Review discharge medications with patient and family and provide education.      Current Discharge Medication List      CONTINUE these medications which have NOT CHANGED    Details   NAMENDA 10 mg Tab Take 10 mg by mouth 2 (two) times daily.  Refills: 0      RANEXA 1,000 mg Tb12 Refills: 0      acetaminophen (TYLENOL) 500 MG tablet Take 500 mg by mouth every 6 (six) hours as needed for Pain.      atorvastatin (LIPITOR) 10 MG tablet Take 10 mg by mouth once daily.      ergocalciferol (ERGOCALCIFEROL) 50,000 unit Cap Take 50,000 Units by mouth every 7 days.      gabapentin (NEURONTIN) 100 MG capsule Take 100 mg by mouth 3 (three) times daily.      guaifenesin (MUCINEX) 600 mg 12 hr tablet Take 1,200 mg by mouth 2 (two) times daily.      guaifenesin 100 mg/5 ml  (ROBITUSSIN) 100 mg/5 mL syrup Take 200 mg by mouth 3 (three) times daily as needed for Cough.      isosorbide mononitrate (IMDUR) 30 MG 24 hr tablet Take 30 mg by mouth once daily.  Refills: 11      pantoprazole (PROTONIX) 40 MG tablet Take 40 mg by mouth once daily.      valACYclovir (VALTREX) 1000 MG tablet Take 1 tablet (1,000 mg total) by mouth 3 (three) times daily.  Qty: 21 tablet, Refills: 0         STOP taking these medications       benzonatate (TESSALON) 100 MG capsule Comments:   Reason for Stopping:         amlodipine (NORVASC) 10 MG tablet Comments:   Reason for Stopping:         benazepril (LOTENSIN) 10 MG tablet Comments:   Reason for Stopping:         benazepril (LOTENSIN) 20 MG tablet Comments:   Reason for Stopping:         cefUROXime (CEFTIN) 500 MG tablet Comments:   Reason for Stopping:         potassium chloride (MICRO-K) 10 MEQ CpSR Comments:   Reason for Stopping:         traMADol (ULTRAM) 50 mg tablet Comments:   Reason for Stopping:               I certify that this patient is confined to her home and needs intermittent skilled nursing care, physical therapy and occupational therapy.

## 2018-04-13 NOTE — PLAN OF CARE
Problem: Patient Care Overview  Goal: Plan of Care Review  Outcome: Ongoing (interventions implemented as appropriate)   04/13/18 8192   Coping/Psychosocial   Plan Of Care Reviewed With patient   A and O x 4. Able to voice needs. ivf in progress. Cont to receive Protonix Iv. Denies pain throughout shift. Remains free of fall and injury this shift. Bed alarm and tyesha in use. No further visual bloody stools noted this shift.

## 2018-04-15 LAB
BACTERIA BLD CULT: NORMAL
BACTERIA BLD CULT: NORMAL

## 2018-04-15 NOTE — HOSPITAL COURSE
Ms. Curtis was readmitted for syncope and hematochezia. She reported no abdominal pain, and no reported melena or N/V. Her blood pressure was low at presentation at 70/42 which responded to fluid challenge of 500mL bolus. She had an overall drop in Hgb of 14.1 from few days ago to 12.5 by the time of discharge. Pt was evaluated by GI who felt bleeding was due to a self resolving diverticular bleed. Pt was closely monitored and she did not have return of bleeding, therefore, she did not need endoscopy. Her H/H level drop was not low enough to warrant transfusion and her blood pressure normalized after volume correction. Pt was seen by PT/OT with recommendation for SNF placement but the patient refused. Neurology evaluated the patient was well given recurrent syncope, but felt this was all related to hypotension. Her blood pressure was in the normal range while still off of amlodipine and ACEI, therefore, these medications were not restarted upon discharge since this likely contributed to her having recurrent syncope. Finally, patient had shingles that was almost completely resolved and scabbed over. She is to complete her full course of valacyclovir. Pt was counseled on need for SNF, especially given that she lives alone, but she again declined. Pt was arranged for home health with close follow up; but concern was still high for readmission.

## 2018-04-15 NOTE — PT/OT/SLP DISCHARGE
Physical Therapy Discharge Summary    Name: Christina Curtis  MRN: [id number]   Principal Problem: Syncope     Patient Discharged from acute Physical Therapy on 18.  Please refer to prior PT notes for functional status.     Assessment:     Patient was discharged unexpectedly.  Information required to complete an accurate discharge summary is unknown.  Refer to therapy initial evaluation and last progress note for initial and most recent functional status and goal achievement.  Recommendations made may be found in medical record.    Objective:     GOALS:    Physical Therapy Goals        Problem: Physical Therapy Goal    Goal Priority Disciplines Outcome Goal Variances Interventions   Physical Therapy Goal     PT/OT, PT Ongoing (interventions implemented as appropriate)     Description:  Goals to be met by: 18     Patient will increase functional independence with mobility by performin. Supine to sit with Modified McFarlan  2. Rolling to Left and Right with Modified McFarlan  3. Sit to stand transfer with Modified McFarlan using RW  4. Bed to chair transfer with Modified McFarlan using Rolling Walker  5. Gait  x 150 feet with Modified McFarlan using Rolling Walker   6. Lower extremity exercise program x 30 reps per handout, with independence                      Reasons for Discontinuation of Therapy Services  Transfer to alternate level of care.      Plan:     Patient Discharged to: Home with Home Health Service, SNF was recommended.    Madina Aquino, PT  4/15/2018

## 2018-04-15 NOTE — DISCHARGE SUMMARY
[facility] Hospital Medicine  Discharge Summary      Patient Name: Christina Curtis  MRN: [id number]  Admission Date: 4/10/2018  Hospital Length of Stay: 2 days  Discharge Date and Time: 4/13/2018  6:34 PM  Attending Physician: Sonja Jurado MD  Discharging Provider: Sonja Jurado MD  Primary Care Provider: Neetu Mcelroy MD      HPI:   85 y/o female with HTN, HLP, GERD, and history of CVA who was recently admitted for observation for chest pain with shingles and with syncope from 4/8-4/10/18 who was brought back by family (niece) after sustaining another syncopal episode while going home in the car. After arrival back in the ER, patient passed out again after she insisted on using the [facility] restroom. She denied any trauma to her head.  She then had painless bright red, moderate amount of blood seen in toilet bowel while in the ER and blood pressure was slightly lower. She was given IVF with increase in BP.  No heavy NSAID use and previous colonoscopy few years ago. In the ER, BUN/creatinine were increased to 46/2.1 which was an acute change; she was hemoccult +. No repeat head CT was done since she has one on 4/9/2018 which was negative for any acute findings.       Procedure(s) (LRB):  ESOPHAGOGASTRODUODENOSCOPY (EGD) (N/A)      Hospital Course:   Ms. Curtis was readmitted for syncope and hematochezia. She reported no abdominal pain, and no reported melena or N/V. Her blood pressure was low at presentation at 70/42 which responded to fluid challenge of 500mL bolus. She had an overall drop in Hgb of 14.1 from few days ago to 12.5 by the time of discharge. Pt was evaluated by GI who felt bleeding was due to a self resolving diverticular bleed. Pt was closely monitored and she did not have return of bleeding, therefore, she did not need endoscopy. Her H/H level drop was not low enough to warrant transfusion and her blood pressure normalized after volume correction. Pt was seen by PT/OT with  recommendation for SNF placement but the patient refused. Neurology evaluated the patient was well given recurrent syncope, but felt this was all related to hypotension. Her blood pressure was in the normal range while still off of amlodipine and ACEI, therefore, these medications were not restarted upon discharge since this likely contributed to her having recurrent syncope. Finally, patient had shingles that was almost completely resolved and scabbed over. She is to complete her full course of valacyclovir. Pt was counseled on need for SNF, especially given that she lives alone, but she again declined. Pt was arranged for home health with close follow up; but concern was still high for readmission.     Consults:   Consults         Status Ordering Provider     Inpatient consult to Gastroenterology  Once     Provider:  Haris Betancourt MD    Completed TELLY FIELDS     Inpatient consult to Neurology  Once     Provider:  Edwin Michelle MD    Completed JUAREZ VICTORIA        Service: Hospital Medicine    Final Active Diagnoses:    Diagnosis Date Noted POA    PRINCIPAL PROBLEM:  Syncope [R55] 04/11/2018 Yes    GIB (gastrointestinal bleeding) [K92.2] 04/11/2018 Yes    Acute renal failure [N17.9] 04/11/2018 Yes    History of CVA (cerebrovascular accident) [Z86.73] 04/09/2018 Not Applicable     Chronic    Shingles [B02.9] 04/09/2018 Yes    Essential hypertension [I10] 04/08/2018 Yes     Chronic    Hyperlipidemia [E78.5] 04/08/2018 Yes     Chronic    GERD (gastroesophageal reflux disease) [K21.9] 04/08/2018 Yes     Chronic    Dementia with behavioral disturbance [F03.91] 04/08/2018 Yes     Chronic      Problems Resolved During this Admission:    Diagnosis Date Noted Date Resolved POA       Discharged Condition: good    Disposition: Home or Self Care    Follow Up:  Follow-up Information     Asa Mccarty MD. Go on 5/15/2018.    Specialty:  Neurology  Why:  Tuesday at 2:40PM  Contact information:  [address]  [facility]  [address]  [facility]  [phone]             Neetu Mcelroy MD. Call in 1 week.    Specialty:  Internal Medicine  Why:  Call to schedule Appointment within 1 week of discharge.  Contact information:  [address] [place] [address]  [address]  [place] [address]  [phone]             [facility].    Specialty:  Home Health Services  Why:  Home Health: Skilled Nursing, Physical Therapy and Occupational Therapy  Contact information:  [address] [place] [id number]  [phone]                 Patient Instructions:     Diet Cardiac     Activity as tolerated     No driving until:   Order Comments: Until cleared by her PCP         Significant Diagnostic Studies:     Pending Diagnostic Studies:     None         Medications:  Reconciled Home Medications:      Medication List      CONTINUE taking these medications    acetaminophen 500 MG tablet  Commonly known as:  TYLENOL  Take 500 mg by mouth every 6 (six) hours as needed for Pain.     atorvastatin 10 MG tablet  Commonly known as:  LIPITOR  Take 10 mg by mouth once daily.     ergocalciferol 50,000 unit Cap  Commonly known as:  ERGOCALCIFEROL  Take 50,000 Units by mouth every 7 days.     gabapentin 100 MG capsule  Commonly known as:  NEURONTIN  Take 100 mg by mouth 3 (three) times daily.     * guaiFENesin 600 mg 12 hr tablet  Commonly known as:  MUCINEX  Take 1,200 mg by mouth 2 (two) times daily.     * guaifenesin 100 mg/5 ml 100 mg/5 mL syrup  Commonly known as:  ROBITUSSIN  Take 200 mg by mouth 3 (three) times daily as needed for Cough.     isosorbide mononitrate 30 MG 24 hr tablet  Commonly known as:  IMDUR  Take 30 mg by mouth once daily.     NAMENDA 10 MG Tab  Generic drug:  memantine  Take 10 mg by mouth 2 (two) times daily.     pantoprazole 40 MG tablet  Commonly known as:  PROTONIX  Take 40 mg by mouth once daily.     RANEXA 1,000 mg Tb12  Generic drug:  ranolazine     valACYclovir 1000 MG tablet  Commonly known as:   VALTREX  Take 1 tablet (1,000 mg total) by mouth 3 (three) times daily.        * This list has 2 medication(s) that are the same as other medications prescribed for you. Read the directions carefully, and ask your doctor or other care provider to review them with you.            STOP taking these medications    amLODIPine 10 MG tablet  Commonly known as:  NORVASC     benazepril 10 MG tablet  Commonly known as:  LOTENSIN     benazepril 20 MG tablet  Commonly known as:  LOTENSIN     benzonatate 100 MG capsule  Commonly known as:  TESSALON     cefUROXime 500 MG tablet  Commonly known as:  CEFTIN     potassium chloride 10 MEQ Cpsr  Commonly known as:  MICRO-K     traMADol 50 mg tablet  Commonly known as:  ULTRAM            Indwelling Lines/Drains at time of discharge:   Lines/Drains/Airways          No matching active lines, drains, or airways          Time spent on the discharge of patient: 45 minutes  Patient was seen and examined on the date of discharge and determined to be suitable for discharge.         Sonja Jurado MD  Department of Hospital Medicine  [facility]

## 2018-04-17 ENCOUNTER — PATIENT OUTREACH (OUTPATIENT)
Dept: ADMINISTRATIVE | Facility: CLINIC | Age: 83
End: 2018-04-17

## 2018-04-17 RX ORDER — TRAMADOL HYDROCHLORIDE 50 MG/1
50 TABLET ORAL EVERY 6 HOURS PRN
COMMUNITY

## 2018-04-17 NOTE — PROGRESS NOTES
C3 nurse attempted to contact patient. No answer.  C3 nurse attempted to contact Christina Curtis  for a TCC post hospital discharge follow up call. No answer at phone number listed and no voicemail available. The patient does not have a scheduled HOSFU appointment within 7-14 days post hospital discharge date 4/13. Message sent to Physician staff to assist with HOSFU appointment scheduling. NON [facility]

## 2018-04-17 NOTE — Clinical Note
"Pt contacted for a post d/c call and states, "I want to cancel appt w/Dr Mccarty as I have been with Dr Hylton(neuro) since 1995." I did not cancel in EPIC; I wanted to make you aware first, in the event you may call her about that appt. If no problem, feel free to cancel appt. Pt may be reached at . THANKING YOU IN ADVANCE,  Alessia Leal RN [address]"

## 2018-04-17 NOTE — PATIENT INSTRUCTIONS
"Fainting:Uncertain Cause  Fainting (syncope) is a temporary loss of consciousness ("passing out"). It occurs when blood flow to the brain is reduced. Near-fainting ("near-syncope") is very similar to fainting, but you do not fully "pass out".  The common minor causes of fainting include: sudden fear, pain, nausea, emotional stress and overexertion. Suddenly standing up after sitting or lying for a long time can also cause fainting.  The more serious causes for fainting are due to either a very slow or very fast or very slow heart beat ("arrhythmia"), other types of heart disease, dehydration, blood loss, seizure, stroke or ruptured blood vessel in the brain. Taking too much high blood pressure medicine can also cause low blood pressure and fainting.  The exact cause of your episode is not certain. However, the tests today did not show any of the serious causes of fainting. Sometimes further testing is needed to find out if a serious problem exists. Therefore, it is important that you follow-up with your doctor as advised.  Home Care:  1) Rest today. You may resume your normal activities when you are feeling back to normal. It is best to remain with someone who can check on you for the next 24 hours to watch for another episode of fainting.  2) If you become light-headed or dizzy, lie down immediately or sit with your head between your knees.  3) Because we do not know the exact cause of your near fainting spell, it is possible for another spell to occur without warning. Therefore, do not drive a car or operate dangerous equipment, do not take a bath alone (use a shower instead) and do not swim alone until your doctor says that you are no longer in danger of having another fainting spell.  Follow Up  with your doctor as advised.  Get Prompt Medical Attention  if any of the following occur:  -- Another fainting spell occurs, which is not explained by the common causes listed above  -- Chest, arm, neck, jaw, back or " abdominal pain  -- Shortness of breath  -- Severe headache or seizure  -- Blood in vomit, stools (black or red color)  -- Unexpected vaginal bleeding  -- Palpitations (very rapid or very slow or irregular heart beat)  -- Signs of stroke:  Weakness of an arm or leg or one side of the face  Difficulty with speech or vision  Extreme drowsiness, confusion, dizziness or fainting  © 8278-6028 [facility], [address] [place] [address], [place] [address]. All rights reserved. This information is not intended as a substitute for professional medical care. Always follow your healthcare professional's instructions.

## 2018-05-10 RX ORDER — VALACYCLOVIR HYDROCHLORIDE 1 G/1
TABLET, FILM COATED ORAL
Qty: 21 TABLET | Refills: 0 | OUTPATIENT
Start: 2018-05-10

## 2018-06-20 ENCOUNTER — TELEPHONE (OUTPATIENT)
Dept: ADMINISTRATIVE | Facility: CLINIC | Age: 83
End: 2018-06-20

## 2018-08-15 NOTE — SUBJECTIVE & OBJECTIVE
Interval History: Pt report feeling little better    Review of Systems   Constitutional: Negative for fever.   Respiratory: Negative for shortness of breath.    Cardiovascular: Negative for chest pain.     Objective:     Vital signs: Reviewed  Weight: 55.8 kg (123 lb)  Body mass index is 22.5 kg/m².  Intake or output data in the 24 hours: Reviewed    Physical Exam   Constitutional: She is oriented to person, place, and time. She appears well-developed. No distress.   HENT:   Head: Normocephalic and atraumatic.   Eyes: EOM are normal. Pupils are equal, round, and reactive to light.   Neck: Normal range of motion. Neck supple.   Cardiovascular: Normal rate and regular rhythm.   Pulmonary/Chest: Effort normal and breath sounds normal.   Abdominal: Soft. Bowel sounds are normal.   Musculoskeletal: Normal range of motion.   Neurological: She is alert and oriented to person, place, and time.   Skin: Capillary refill takes less than 2 seconds. She is not diaphoretic.   Left axilla crusted over lesions in dermatome distribution   Psychiatric: Her speech is delayed. She is slowed.       Significant Labs: All pertinent labs within the past 24 hours have been reviewed.    Significant Imaging: I have reviewed and interpreted all pertinent imaging results/findings within the past 24 hours.

## 2018-08-15 NOTE — PROGRESS NOTES
[facility] Hospital Medicine  Progress Note    Patient Name: Christina Curtis  MRN: [id number]  Patient Class: IP- Inpatient   Admission Date: 4/10/2018  Attending Physician: Sonja Jurado MD  Primary Care Provider: Neetu Mcelroy MD        Subjective:     Principal Problem:Syncope    HPI:  87 y/o female with HTN, HLP, GERD, and history of CVA who was recently admitted for observation for chest pain with shingles and with syncope from 4/8-4/10/18 who was brought back by family (niece) after sustaining another syncopal episode while going home in the car. After arrival back in the ER, patient passed out again after she insisted on using the [place] restroom. She denied any trauma to her head.  She then had painless bright red, moderate amount of blood seen in toilet bowel while in the ER and blood pressure was slightly lower. She was given IVF with increase in BP.  No heavy NSAID use and previous colonoscopy few years ago. In the ER, BUN/creatinine were increased to 46/2.1 which was an acute change; she was hemoccult +. No repeat head CT was done since she has one on 4/9/2018 which was negative for any acute findings.       Hospital Course:  Ms. Curtis was readmitted for syncope and hematochezia. She reported no abdominal pain, and no reported melena or N/V. Her blood pressure was low at presentation at 70/42 which responded to fluid challenge of 500mL bolus. Pt was evaluated by GI and Neurology.    Interval History: Pt report feeling little better, no more bleeding reported overnight    Review of Systems   Constitutional: Negative for fever.   Respiratory: Negative for shortness of breath.    Cardiovascular: Negative for chest pain.     Objective:     Vital signs: Reviewed  Weight: 55.8 kg (123 lb)  Body mass index is 22.5 kg/m².  Intake or output data in the 24 hours: Reviewed    Physical Exam   Constitutional: She is oriented to person, place, and time. She appears well-developed. No distress.    HENT:   Head: Normocephalic and atraumatic.   Eyes: EOM are normal. Pupils are equal, round, and reactive to light.   Neck: Normal range of motion. Neck supple.   Cardiovascular: Normal rate and regular rhythm.   Pulmonary/Chest: Effort normal and breath sounds normal.   Abdominal: Soft. Bowel sounds are normal.   Musculoskeletal: Normal range of motion.   Neurological: She is alert and oriented to person, place, and time.   Skin: Capillary refill takes less than 2 seconds. She is not diaphoretic.   Left axilla crusted over lesions in dermatome distribution   Psychiatric: Her speech is delayed. She is slowed.       Significant Labs: All pertinent labs within the past 24 hours have been reviewed.    Significant Imaging: I have reviewed and interpreted all pertinent imaging results/findings within the past 24 hours.    Assessment/Plan:      * Syncope    Likely due to hypotension associated with volume loss  However, cannot r/o vasovagal given temporal relationship with bowel movements  Treat with IVF, hold anti-HTN medications and workup GIB  Appreciate GI and Neurology input  Monitor on telemetry  No need to repeat head CT or ECHO        GIB (gastrointestinal bleeding)    Pt with painless hematochezia  Possible diverticular bleed which may have self resolved  Appreciate GI input and will monitor with serial labs  Continue PPI  No more bleeding thus far, continue to monitor  No need for transfusion        Acute renal failure    Likely prerenal with hypotension  Treat with IVF and monitor, improving        Shingles    Resolving nicely with valacyclovir, to complete full course  Contact precautions but now lesions are crusted        History of CVA (cerebrovascular accident)    On statin, but will hold ASA for now due to bleeding        Dementia with behavioral disturbance    Mentation at baseline and very mild cognitive impairment  Continue namenda        GERD (gastroesophageal reflux disease)    Continue PPI         Hyperlipidemia    Continue statin        Essential hypertension    Hold anti-HTN medications for now          VTE Risk Mitigation (From admission, onward)        Ordered     IP VTE HIGH RISK PATIENT  Once      04/11/18 0056              Sonja Jurado MD  Department of Hospital Medicine   [facility]

## 2019-06-10 NOTE — PLAN OF CARE
Problem: Fall Risk (Adult)  Intervention: Monitor/Assist with Self Care   04/11/18 0801   Activity   Activity Assistance Provided assistance, 1 person   No falls or injuries this shift         SEBORRHEIC KERATOSES        What causes seborrheic keratoses?    Seborrheic keratoses are harmless, common skin growths that first appear during adult life.  As time goes by, more growths appear.  Some persons have a very large number of them.  Seborrheic keratoses appear on both covered and uncovered parts of the body; they are not caused by sunlight.  The tendency to develop seborrheic keratoses is inherited.    Seborrheic keratoses are harmless and never become malignant.  They begin as slightly raised, light brown spots.  Gradually they thicken and take on a rough wartlike surface.  They slowly darken and may turn black.  These color changes are harmless.  Seborrheic keratoses are superficial and look as if they were stuck on the skin.  Persons who have had several seborrheic keratoses can usually recognize this type of benign growth.  However, if you are concerned or unsure about any growth, consult me.    Treatment    Seborrheic keratoses can easily be removed in the office.  The only reason for removing a seborrheic keratosis is your wish to get rid of it.      CRYOSURGERY      Your doctor has used a method called cryosurgery to treat your skin condition. Cryosurgery refers to the use of very cold substances to treat a variety of skin conditions such as warts, pre-skin cancers, molluscum contagiosum, sun spots, and several benign growths. The substance we use in cryosurgery is liquid nitrogen and is so cold (-195 degrees Celsius) that is burns when administered.     Following treatment in the office, the skin may immediately burn and become red. You may find the area around the lesion is affected as well. It is sometimes necessary to treat not only the lesion, but a small area of the surrounding normal skin to achieve a good response.     A blister, and even a blood filled blister, may form after treatment.   This is a normal response. If the blister is painful, it is acceptable to sterilize a needle and with  rubbing alcohol and gently pop the blister. It is important that you gently wash the area with soap and warm water as the blister fluid may contain wart virus if a wart was treated. Do no remove the roof of the blister.     The area treated can take anywhere from 1-3 weeks to heal. Healing time depends on the kind skin lesion treated, the location, and how aggressively the lesion was treated. It is recommended that the areas treated are covered with Vaseline or bacitracin ointment and a band-aid. If a band-aid is not practical, just ointment applied several times per day will do. Keeping these areas moist will speed the healing time.    Treatment with liquid nitrogen can leave a scar. In dark skin, it may be a light or dark scar, in light skin it may be a white or pink scar. These will generally fade with time, but may never go away completely.     If you have any concerns after your treatment, please feel free to call the office.       [address] [place] [address]/ [phone] FAX/ [email or web address]

## 2020-09-02 NOTE — ED NOTES
Luekocytes, nitrates and blood are positive in urine test.   Nurse is sending away for culture but since it is a holiday weekend they will not be able to get results until after holiday. Garrison Jennings was wondering if Dr. Natasha Carrel send a medicine over to [facility] to get her thru the holiday weekend. Patient's symptoms are pain with urination, pelvic pain, she states it hurts very bad from her bottom area to the top of her head. Monitor tech informed of patient going up to the floor on box 9[address]   Yes, lets get her on something right away. Sent to her pharmacy. Please schedule 2 week followup with her extended, with labs. Tiana Luciano(Resident)

## 2022-05-30 DIAGNOSIS — I50.42 CHRONIC COMBINED SYSTOLIC AND DIASTOLIC HEART FAILURE: Primary | ICD-10-CM

## 2022-05-30 DIAGNOSIS — I35.0 AORTIC VALVE STENOSIS, ETIOLOGY OF CARDIAC VALVE DISEASE UNSPECIFIED: ICD-10-CM

## 2022-06-07 ENCOUNTER — DOCUMENTATION ONLY (OUTPATIENT)
Dept: CARDIOLOGY | Facility: CLINIC | Age: 87
End: 2022-06-07
Payer: MEDICARE

## 2022-06-07 NOTE — PROGRESS NOTES
Patient referred to Dr. Julian for TAVR evaluation by Dr. Pope.  Received records and echo disc.  Multiple attempts to contact patient to schedule appointment.  Patient resides in Assisted Living (?)  No voice mail or ability to leave a message.  Will continue to try and contact.

## 2022-06-23 PROBLEM — I35.0 AORTIC STENOSIS: Status: ACTIVE | Noted: 2022-06-23

## 2022-06-23 NOTE — PROGRESS NOTES
Subjective:    Patient ID:  Chrisitna Curtis is a 89 y.o. female who presents for evaluation of aortic stenosis.     Referring Physician: Dr Pope    HPI  Christina Curtis is a 89 y.o. female with a history of dementia (lives in nursing home), carotid disease, CHF, CKD, CVA, GERD, HTN, HLD, syncope who presents for evaluation of aortic stenosis.    This patient is wheelchair bound with very minimal symptoms (NYHA I). She cannot walk because of arthritis, even around the nursing home.  She currently lives in a nursing home, and was brought to clinic today by her daughters.     Christina Curtis is a 89 y.o. female referred by Dr Pope for evaluation of severe AS with minimal SOMERS.   (NYHA Class I sx).    The patient has undergone the following TAVR work-up:    ECHO (Date 4/26/22): ONEL= 0.8 cm2, MG= 66.8 mmHg, Peak Watson= 4.04 m/s, EF= %.    LHC (Date ):     STS: 5%    Frailty:4 /4    Iliacs are N/A   LVOT area by CTA is: N/A   Incidental findings on CT: N/A   CT Surgery risk assessment: inoperable risk, per Dr Kern   Rhythm issues: none   PFTs: Not done   KCCQ/5 meter walk:    Comorbidities: dementia, hx CVA, currently lives in nursing home    NYHA I    Review of Systems   Constitutional: Negative for chills and fever.   HENT: Negative for sore throat.    Eyes: Negative for blurred vision.   Cardiovascular: Negative for chest pain, claudication, cyanosis, dyspnea on exertion, irregular heartbeat, leg swelling, near-syncope, orthopnea, palpitations, paroxysmal nocturnal dyspnea and syncope.   Respiratory: Negative for cough and sputum production.    Hematologic/Lymphatic: Does not bruise/bleed easily.   Skin: Negative for itching, rash and suspicious lesions.   Musculoskeletal: Negative for falls.   Gastrointestinal: Negative for abdominal pain and change in bowel habit.   Genitourinary: Negative for dysuria.   Neurological: Negative for disturbances in coordination,  dizziness and loss of balance.   Psychiatric/Behavioral: Negative for altered mental status.          Past Medical History:   Diagnosis Date    Allergic rhinitis, cause unspecified     Essential hypertension, benign     Hyperlipidemia     Hypertension     Shingles     Unspecified vitamin D deficiency        Current Outpatient Medications:     acetaminophen (TYLENOL) 500 MG tablet, Take 500 mg by mouth every 6 (six) hours as needed for Pain., Disp: , Rfl:     atorvastatin (LIPITOR) 10 MG tablet, Take 10 mg by mouth once daily., Disp: , Rfl:     ergocalciferol (ERGOCALCIFEROL) 50,000 unit Cap, Take 50,000 Units by mouth every 7 days., Disp: , Rfl:     gabapentin (NEURONTIN) 100 MG capsule, Take 100 mg by mouth 3 (three) times daily., Disp: , Rfl:     guaifenesin (MUCINEX) 600 mg 12 hr tablet, Take 1,200 mg by mouth 2 (two) times daily., Disp: , Rfl:     guaifenesin 100 mg/5 ml (ROBITUSSIN) 100 mg/5 mL syrup, Take 200 mg by mouth 3 (three) times daily as needed for Cough., Disp: , Rfl:     isosorbide mononitrate (IMDUR) 30 MG 24 hr tablet, Take 30 mg by mouth once daily., Disp: , Rfl: 11    NAMENDA 10 mg Tab, Take 10 mg by mouth 2 (two) times daily., Disp: , Rfl: 0    pantoprazole (PROTONIX) 40 MG tablet, Take 40 mg by mouth once daily., Disp: , Rfl:     RANEXA 1,000 mg Tb12, Take 1,000 mg by mouth 2 (two) times daily. , Disp: , Rfl: 0    traMADol (ULTRAM) 50 mg tablet, Take 50 mg by mouth every 6 (six) hours as needed for Pain., Disp: , Rfl:     valACYclovir (VALTREX) 1000 MG tablet, Take 1 tablet (1,000 mg total) by mouth 3 (three) times daily., Disp: 21 tablet, Rfl: 0    Objective:    Physical Exam  Vitals reviewed.   Constitutional:       General: She is not in acute distress.     Appearance: She is well-developed. She is not diaphoretic.   HENT:      Head: Normocephalic and atraumatic.   Neck:      Vascular: No JVD.   Cardiovascular:      Rate and Rhythm: Normal rate and regular rhythm.       "Pulses: Intact distal pulses.      Heart sounds: Murmur heard.    Harsh midsystolic murmur is present at the upper right sternal border radiating to the neck.  Pulmonary:      Effort: Pulmonary effort is normal. No respiratory distress.      Breath sounds: Normal breath sounds.   Musculoskeletal:      Cervical back: Normal range of motion.      Right lower leg: No edema.      Left lower leg: No edema.   Skin:     General: Skin is warm and dry.   Neurological:      Mental Status: She is alert and oriented to person, place, and time.             Vitals:    06/27/22 1524 06/27/22 1526   BP: (!) 163/69 (!) 162/70   BP Location: Right arm Left arm   Patient Position: Sitting Sitting   BP Method: Large (Automatic) Large (Automatic)   Pulse: 66 66   SpO2: 95%    Weight: 56.5 kg (124 lb 9 oz)    Height: 5' 2" (1.575 m)      Body mass index is 22.78 kg/m².    Test(s) Reviewed  I have reviewed the following in detail:  [] Stress test   [] Angiography   [] Echocardiogram   [] Labs   [] Other       Chemistry        Component Value Date/Time     04/12/2018 0843    K 4.7 04/12/2018 0843     04/12/2018 0843    CO2 24 04/12/2018 0843    BUN 13 04/12/2018 0843    CREATININE 0.9 04/12/2018 0843     (H) 04/12/2018 0843        Component Value Date/Time    CALCIUM 8.9 04/12/2018 0843    ALKPHOS 88 04/12/2018 0843    AST 21 04/12/2018 0843    ALT 31 04/12/2018 0843    BILITOT 0.7 04/12/2018 0843    ESTGFRAFRICA >60 04/12/2018 0843    EGFRNONAA 58 (A) 04/12/2018 0843            Assessment:   Aortic stenosis  Christina Crutis is a 89 y.o. female referred by Dr Pope for evaluation of severe AS (NYHA Class II sx).  She lives in a nursing home, is wheelchair bound, debilitated and minimally symptomatic.  Cohort C. Candidate for BAV as palliation only if symptoms refractory to medical therapy.    The patient has undergone the following TAVR work-up:    ECHO (Date 4/26/22): ONEL= 0.8 cm2, MG= 66.8 mmHg, Peak " Watson= 4.04 m/s, EF= %.    LHC (Date ):     STS: 5%    Frailty: /4    Iliacs are    LVOT area by CTA is    Incidental findings on CT:    CT Surgery risk assessment: inoperable risk, per Dr Kern   Rhythm issues: none   PFTs:    KCCQ/5 meter walk:    Comorbidities: dementia, hx CVA, currently lives in nursing home   Antibiotics given?    History of CVA (cerebrovascular accident)  Dementia with behavioral disturbance  Currently lives in nursing home.     Essential hypertension  Chronic. Controlled. Follow up with Cardiology/PCP.    Syncope and collapse   PPM risk factor post TAVR.     Plan:     1. Patient is not a TAVR candidate due to her lack of symptoms, dementia and COHORT C status.   2. If she develops NYHA IV symptoms refractory to medical therapy, we can offer BAV for symptom relief.  3. Continue medical therapy with Dr Pope         Shared Decision Making:  This patient was seen by a multidisciplinary heart team involving both a Structural Heart Specialist (Interventional Cardiologist) and a Cardiothoracic Surgeon. The patient was involved in shared decision-making to define clearer goals for treatment and align health decisions with their current values.  The patient understands the risks and expected benefits of their treatment options.             Xi Trinh PA-C  Valve and Structural Heart Disease  [facility]    Staff:  I have personally taken the history and examined this patient and agree with the fellow's note as stated above and amended it accordingly :-)

## 2022-06-27 ENCOUNTER — OFFICE VISIT (OUTPATIENT)
Dept: CARDIOLOGY | Facility: CLINIC | Age: 87
End: 2022-06-27
Payer: MEDICARE

## 2022-06-27 VITALS
OXYGEN SATURATION: 95 % | DIASTOLIC BLOOD PRESSURE: 70 MMHG | BODY MASS INDEX: 22.92 KG/M2 | HEART RATE: 66 BPM | WEIGHT: 124.56 LBS | HEIGHT: 62 IN | SYSTOLIC BLOOD PRESSURE: 162 MMHG

## 2022-06-27 DIAGNOSIS — I35.0 AORTIC VALVE STENOSIS, ETIOLOGY OF CARDIAC VALVE DISEASE UNSPECIFIED: Primary | ICD-10-CM

## 2022-06-27 DIAGNOSIS — Z86.73 HISTORY OF CVA (CEREBROVASCULAR ACCIDENT): Chronic | ICD-10-CM

## 2022-06-27 DIAGNOSIS — R55 SYNCOPE AND COLLAPSE: ICD-10-CM

## 2022-06-27 DIAGNOSIS — I10 ESSENTIAL HYPERTENSION: Chronic | ICD-10-CM

## 2022-06-27 DIAGNOSIS — F03.91 DEMENTIA WITH BEHAVIORAL DISTURBANCE, UNSPECIFIED DEMENTIA TYPE: Chronic | ICD-10-CM

## 2022-06-27 DIAGNOSIS — I35.0 AORTIC VALVE STENOSIS, ETIOLOGY OF CARDIAC VALVE DISEASE UNSPECIFIED: ICD-10-CM

## 2022-06-27 PROCEDURE — 99999 PR PBB SHADOW E&M-EST. PATIENT-LVL III: CPT | Mod: PBBFAC,,, | Performed by: INTERNAL MEDICINE

## 2022-06-27 PROCEDURE — 99204 OFFICE O/P NEW MOD 45 MIN: CPT | Mod: S$PBB,,, | Performed by: INTERNAL MEDICINE

## 2022-06-27 PROCEDURE — 99205 PR OFFICE/OUTPT VISIT, NEW, LEVL V, 60-74 MIN: ICD-10-PCS | Mod: S$PBB,,, | Performed by: THORACIC SURGERY (CARDIOTHORACIC VASCULAR SURGERY)

## 2022-06-27 PROCEDURE — 99205 OFFICE O/P NEW HI 60 MIN: CPT | Mod: S$PBB,,, | Performed by: THORACIC SURGERY (CARDIOTHORACIC VASCULAR SURGERY)

## 2022-06-27 PROCEDURE — 99204 PR OFFICE/OUTPT VISIT, NEW, LEVL IV, 45-59 MIN: ICD-10-PCS | Mod: S$PBB,,, | Performed by: INTERNAL MEDICINE

## 2022-06-27 PROCEDURE — 99213 OFFICE O/P EST LOW 20 MIN: CPT | Mod: PBBFAC | Performed by: INTERNAL MEDICINE

## 2022-06-27 PROCEDURE — 99999 PR PBB SHADOW E&M-EST. PATIENT-LVL III: ICD-10-PCS | Mod: PBBFAC,,, | Performed by: INTERNAL MEDICINE

## 2022-06-27 NOTE — PROGRESS NOTES
Cardiothoracic Surgery  Transcatheter Aortic Valve Replacement Evaluation      SUBJECTIVE:     History of Present Illness:  Christina Curtis is a 89 y.o. female with a history of dementia (lives in nursing home), carotid disease, CHF, CKD, CVA, GERD, HTN, HLD, syncope who presents for evaluation of aortic stenosis.     Christina Curtis is a 89 y.o. female referred by Dr Pope for evaluation of severe AS.    Past Medical History:   Diagnosis Date    Allergic rhinitis, cause unspecified     Essential hypertension, benign     Hyperlipidemia     Hypertension     Shingles     Unspecified vitamin D deficiency      Past Surgical History:   Procedure Laterality Date    BACK SURGERY      HYSTERECTOMY       Family History   Problem Relation Age of Onset    No Known Problems Mother     No Known Problems Father     No Known Problems Sister     No Known Problems Brother     No Known Problems Maternal Aunt     No Known Problems Maternal Uncle     No Known Problems Paternal Aunt     No Known Problems Paternal Uncle     No Known Problems Maternal Grandmother     No Known Problems Maternal Grandfather     No Known Problems Paternal Grandmother     No Known Problems Paternal Grandfather     Anemia Neg Hx     Arrhythmia Neg Hx     Asthma Neg Hx     Clotting disorder Neg Hx     Fainting Neg Hx     Heart attack Neg Hx     Heart disease Neg Hx     Heart failure Neg Hx     Hyperlipidemia Neg Hx     Hypertension Neg Hx     Stroke Neg Hx     Atrial Septal Defect Neg Hx      Social History     Tobacco Use    Smoking status: Never Smoker    Smokeless tobacco: Never Used   Substance Use Topics    Alcohol use: No    Drug use: No      Review of patient's allergies indicates:   Allergen Reactions    Codeine     Darvon [propoxyphene]     Excedrin migraine [aspirin-acetaminophen-caffeine]     Zoloft [sertraline]      Current medications reviewed    Review of Systems:  Constitutional:  Negative for fever, chills, distress  Skin: no acute disorders.  Negative for rash, itching  HEENT: unremarkable.  Negative for sore throat, congestion  Cardiovascular:   Negative for chest pain orthopnea lower extremity edema.  Respiratory: Positive for shortness of breath.  Negative for cough.  GI:  unremarkable.  Negative for abdominal pain, vomiting  :  Negative for hematuria, flank pain  Musculoskeletal: unremarkable.  Negative for falls, myalgias  Neuro:  Negative for dizziness, LOC  Psych:  Negative for substance abuse, memory loss      OBJECTIVE:     Vital Signs (Most Recent)     Vital signs reviewed    Physical Exam:  General Appearance: no acute distress.  Normal for age  Skin: no acute lesions, minor bruises from phlebotomy  HEENT: no masses/hematoma, Jugular veins: not distended  Resp:  excursion/effort normal; clear to auscultation  CV:  Rate:  regular  Rhythm: regular  Murmur:  systolic ejection murmur at right upper sternal border  GI: Bowel sounds: present; abdo soft, nondistended, nontender, no masses palpable  Extrem: Edema: minimal  Pulses: normal  Neuro: Alert and oriented; no focal deficit  Psych: no acute delirium noted  : voiding well  MSK: no acute findings, ranges of motion unchanged      I have personally reviewed the imaging, electrocardiogram, and pertinent lab findings    ASSESSMENT/PLAN:       89 y.o. female with multiple comorbidities presented with symptomatic severe aortic stenosis. Deemed inoperable for surgical aortic valve replacement (SAVR) due to frailty (wheelchair bound) and age.  We recommend medical management.    Shared Decision Making:  This patient was seen by a multidisciplinary heart team involving both a Structural Heart Specialist (Interventional Cardiologist) and a Cardiothoracic Surgeon. The patient was involved in shared decision-making to define clearer goals for treatment and align health decisions with their current values.  The patient understands the risks  and expected benefits of their treatment options.

## 2024-10-11 NOTE — PROGRESS NOTES
Caller: MILAD    Relationship: [facility]    Best call back number: [phone]     What was the call regarding: REQUESTS CALL BACK TO DISCUSS ORDERS FOR HOME HEALTH FOR PATIENT   Attempted to call relative Brenda to notify of pt fall. No answer.